# Patient Record
Sex: FEMALE | NOT HISPANIC OR LATINO | URBAN - METROPOLITAN AREA
[De-identification: names, ages, dates, MRNs, and addresses within clinical notes are randomized per-mention and may not be internally consistent; named-entity substitution may affect disease eponyms.]

---

## 2018-06-27 ENCOUNTER — OFFICE VISIT - HEALTHEAST (OUTPATIENT)
Dept: FAMILY MEDICINE | Facility: CLINIC | Age: 63
End: 2018-06-27

## 2018-06-27 DIAGNOSIS — Z48.02 VISIT FOR SUTURE REMOVAL: ICD-10-CM

## 2019-03-01 ENCOUNTER — OFFICE VISIT - RIVER FALLS (OUTPATIENT)
Dept: FAMILY MEDICINE | Facility: CLINIC | Age: 64
End: 2019-03-01

## 2019-03-01 ASSESSMENT — MIFFLIN-ST. JEOR: SCORE: 1637.66

## 2019-03-02 LAB
A/G RATIO - HISTORICAL: 1.6 (ref 1–2.5)
ALBUMIN SERPL-MCNC: 4.1 GM/DL (ref 3.6–5.1)
ALP SERPL-CCNC: 79 UNIT/L (ref 33–130)
ALT SERPL W P-5'-P-CCNC: 11 UNIT/L (ref 6–29)
AST SERPL W P-5'-P-CCNC: 12 UNIT/L (ref 10–35)
BILIRUB SERPL-MCNC: 0.3 MG/DL (ref 0.2–1.2)
BUN SERPL-MCNC: 13 MG/DL (ref 7–25)
BUN/CREAT RATIO - HISTORICAL: ABNORMAL (ref 6–22)
CALCIUM SERPL-MCNC: 9.1 MG/DL (ref 8.6–10.4)
CHLORIDE BLD-SCNC: 106 MMOL/L (ref 98–110)
CO2 SERPL-SCNC: 28 MMOL/L (ref 20–32)
CREAT SERPL-MCNC: 0.91 MG/DL (ref 0.5–0.99)
EGFRCR SERPLBLD CKD-EPI 2021: 66 ML/MIN/1.73M2
ERYTHROCYTE [DISTWIDTH] IN BLOOD BY AUTOMATED COUNT: 15.9 % (ref 11–15)
GLOBULIN: 2.6 (ref 1.9–3.7)
GLUCOSE BLD-MCNC: 110 MG/DL (ref 65–99)
HBA1C MFR BLD: 6.3 %
HCT VFR BLD AUTO: 30.9 % (ref 35–45)
HGB BLD-MCNC: 8.9 GM/DL (ref 11.7–15.5)
MCH RBC QN AUTO: 19.4 PG (ref 27–33)
MCHC RBC AUTO-ENTMCNC: 28.8 GM/DL (ref 32–36)
MCV RBC AUTO: 67.3 FL (ref 80–100)
PLATELET # BLD AUTO: 325 10*3/UL (ref 140–400)
PMV BLD: 9.9 FL (ref 7.5–12.5)
POTASSIUM BLD-SCNC: 4.3 MMOL/L (ref 3.5–5.3)
PROT SERPL-MCNC: 6.7 GM/DL (ref 6.1–8.1)
RBC # BLD AUTO: 4.59 10*6/UL (ref 3.8–5.1)
SODIUM SERPL-SCNC: 141 MMOL/L (ref 135–146)
TSH SERPL DL<=0.005 MIU/L-ACNC: 2.18 MIU/L (ref 0.4–4.5)
WBC # BLD AUTO: 5.7 10*3/UL (ref 3.8–10.8)

## 2019-03-05 LAB
FERRITIN SERPL-MCNC: 5 NG/ML (ref 20–288)
IRON SATURATION: 3 (ref 11–50)
IRON SERPL-MCNC: 14 MCG/DL (ref 45–160)
LDLC SERPL CALC-MCNC: 131 MG/DL
TIBC - QUEST: 438 (ref 250–450)
TRANSFERRIN: 339 MG/DL (ref 188–341)

## 2019-03-15 ENCOUNTER — OFFICE VISIT - RIVER FALLS (OUTPATIENT)
Dept: FAMILY MEDICINE | Facility: CLINIC | Age: 64
End: 2019-03-15

## 2019-03-15 ASSESSMENT — MIFFLIN-ST. JEOR: SCORE: 1624.05

## 2019-04-10 ENCOUNTER — OFFICE VISIT - RIVER FALLS (OUTPATIENT)
Dept: FAMILY MEDICINE | Facility: CLINIC | Age: 64
End: 2019-04-10

## 2019-04-11 LAB
ERYTHROCYTE [DISTWIDTH] IN BLOOD BY AUTOMATED COUNT: 25.5 % (ref 11–15)
HCT VFR BLD AUTO: 40.2 % (ref 35–45)
HGB BLD-MCNC: 12.1 GM/DL (ref 11.7–15.5)
MCH RBC QN AUTO: 22.1 PG (ref 27–33)
MCHC RBC AUTO-ENTMCNC: 30.1 GM/DL (ref 32–36)
MCV RBC AUTO: 73.5 FL (ref 80–100)
PLATELET # BLD AUTO: 287 10*3/UL (ref 140–400)
PMV BLD: 11 FL (ref 7.5–12.5)
RBC # BLD AUTO: 5.47 10*6/UL (ref 3.8–5.1)
WBC # BLD AUTO: 4.9 10*3/UL (ref 3.8–10.8)

## 2019-07-19 ENCOUNTER — OFFICE VISIT - RIVER FALLS (OUTPATIENT)
Dept: FAMILY MEDICINE | Facility: CLINIC | Age: 64
End: 2019-07-19

## 2019-07-19 ASSESSMENT — MIFFLIN-ST. JEOR: SCORE: 1650.36

## 2019-07-20 LAB
ERYTHROCYTE [DISTWIDTH] IN BLOOD BY AUTOMATED COUNT: 17.2 % (ref 11–15)
FERRITIN SERPL-MCNC: 27 NG/ML (ref 16–288)
HCT VFR BLD AUTO: 41.1 % (ref 35–45)
HGB BLD-MCNC: 12.8 GM/DL (ref 11.7–15.5)
IRON SATURATION: 17 (ref 16–45)
IRON SERPL-MCNC: 72 MCG/DL (ref 45–160)
MCH RBC QN AUTO: 24.4 PG (ref 27–33)
MCHC RBC AUTO-ENTMCNC: 31.1 GM/DL (ref 32–36)
MCV RBC AUTO: 78.3 FL (ref 80–100)
PLATELET # BLD AUTO: 315 10*3/UL (ref 140–400)
PMV BLD: 10.8 FL (ref 7.5–12.5)
RBC # BLD AUTO: 5.25 10*6/UL (ref 3.8–5.1)
TIBC - QUEST: 416 (ref 250–450)
WBC # BLD AUTO: 6.7 10*3/UL (ref 3.8–10.8)

## 2019-07-24 ENCOUNTER — COMMUNICATION - RIVER FALLS (OUTPATIENT)
Dept: FAMILY MEDICINE | Facility: CLINIC | Age: 64
End: 2019-07-24

## 2019-07-25 ENCOUNTER — OFFICE VISIT - RIVER FALLS (OUTPATIENT)
Dept: FAMILY MEDICINE | Facility: CLINIC | Age: 64
End: 2019-07-25

## 2019-08-16 ENCOUNTER — AMBULATORY - RIVER FALLS (OUTPATIENT)
Dept: FAMILY MEDICINE | Facility: CLINIC | Age: 64
End: 2019-08-16

## 2019-08-22 ENCOUNTER — OFFICE VISIT - RIVER FALLS (OUTPATIENT)
Dept: FAMILY MEDICINE | Facility: CLINIC | Age: 64
End: 2019-08-22

## 2019-09-05 ENCOUNTER — AMBULATORY - RIVER FALLS (OUTPATIENT)
Dept: FAMILY MEDICINE | Facility: CLINIC | Age: 64
End: 2019-09-05

## 2019-09-06 ENCOUNTER — COMMUNICATION - RIVER FALLS (OUTPATIENT)
Dept: FAMILY MEDICINE | Facility: CLINIC | Age: 64
End: 2019-09-06

## 2019-09-06 LAB
BUN SERPL-MCNC: 19 MG/DL (ref 7–25)
BUN/CREAT RATIO - HISTORICAL: 18 (ref 6–22)
CALCIUM SERPL-MCNC: 10 MG/DL (ref 8.6–10.4)
CHLORIDE BLD-SCNC: 103 MMOL/L (ref 98–110)
CO2 SERPL-SCNC: 30 MMOL/L (ref 20–32)
CREAT SERPL-MCNC: 1.04 MG/DL (ref 0.5–0.99)
EGFRCR SERPLBLD CKD-EPI 2021: 56 ML/MIN/1.73M2
GLUCOSE BLD-MCNC: 97 MG/DL (ref 65–139)
POTASSIUM BLD-SCNC: 3.9 MMOL/L (ref 3.5–5.3)
SODIUM SERPL-SCNC: 140 MMOL/L (ref 135–146)

## 2019-10-03 ENCOUNTER — OFFICE VISIT - RIVER FALLS (OUTPATIENT)
Dept: FAMILY MEDICINE | Facility: CLINIC | Age: 64
End: 2019-10-03

## 2019-10-03 ASSESSMENT — MIFFLIN-ST. JEOR: SCORE: 1587.76

## 2019-10-04 ENCOUNTER — OFFICE VISIT - RIVER FALLS (OUTPATIENT)
Dept: FAMILY MEDICINE | Facility: CLINIC | Age: 64
End: 2019-10-04

## 2020-08-05 ENCOUNTER — AMBULATORY - RIVER FALLS (OUTPATIENT)
Dept: FAMILY MEDICINE | Facility: CLINIC | Age: 65
End: 2020-08-05

## 2020-08-06 LAB
ALBUMIN SERPL-MCNC: 4.2 GM/DL (ref 3.6–5.1)
BUN SERPL-MCNC: 20 MG/DL (ref 7–25)
BUN/CREAT RATIO - HISTORICAL: NORMAL (ref 6–22)
CALCIUM SERPL-MCNC: 10.1 MG/DL (ref 8.6–10.4)
CHLORIDE BLD-SCNC: 102 MMOL/L (ref 98–110)
CO2 SERPL-SCNC: 29 MMOL/L (ref 20–32)
CREAT SERPL-MCNC: 0.9 MG/DL (ref 0.5–0.99)
EGFRCR SERPLBLD CKD-EPI 2021: 67 ML/MIN/1.73M2
ERYTHROCYTE [DISTWIDTH] IN BLOOD BY AUTOMATED COUNT: 13 % (ref 11–15)
FERRITIN SERPL-MCNC: 20 NG/ML (ref 16–288)
GLUCOSE BLD-MCNC: 90 MG/DL (ref 65–99)
HCT VFR BLD AUTO: 45.7 % (ref 35–45)
HGB BLD-MCNC: 14.9 GM/DL (ref 11.7–15.5)
IRON SATURATION: 25 (ref 16–45)
IRON SERPL-MCNC: 91 MCG/DL (ref 45–160)
MCH RBC QN AUTO: 27.9 PG (ref 27–33)
MCHC RBC AUTO-ENTMCNC: 32.6 GM/DL (ref 32–36)
MCV RBC AUTO: 85.6 FL (ref 80–100)
PHOSPHATE SERPL-MCNC: 3.5 MG/DL (ref 2.1–4.3)
PLATELET # BLD AUTO: 229 10*3/UL (ref 140–400)
PMV BLD: 11.8 FL (ref 7.5–12.5)
POTASSIUM BLD-SCNC: 3.9 MMOL/L (ref 3.5–5.3)
RBC # BLD AUTO: 5.34 10*6/UL (ref 3.8–5.1)
SODIUM SERPL-SCNC: 139 MMOL/L (ref 135–146)
TIBC - QUEST: 360 (ref 250–450)
WBC # BLD AUTO: 5.4 10*3/UL (ref 3.8–10.8)

## 2020-08-14 ENCOUNTER — COMMUNICATION - RIVER FALLS (OUTPATIENT)
Dept: FAMILY MEDICINE | Facility: CLINIC | Age: 65
End: 2020-08-14

## 2021-04-02 ENCOUNTER — OFFICE VISIT - RIVER FALLS (OUTPATIENT)
Dept: FAMILY MEDICINE | Facility: CLINIC | Age: 66
End: 2021-04-02

## 2021-04-02 LAB
BUN SERPL-MCNC: 15 MG/DL (ref 7–25)
BUN/CREAT RATIO - HISTORICAL: ABNORMAL (ref 6–22)
CALCIUM SERPL-MCNC: 10.3 MG/DL (ref 8.6–10.4)
CHLORIDE BLD-SCNC: 103 MMOL/L (ref 98–110)
CO2 SERPL-SCNC: 29 MMOL/L (ref 20–32)
CREAT SERPL-MCNC: 0.91 MG/DL (ref 0.5–0.99)
EGFRCR SERPLBLD CKD-EPI 2021: 65 ML/MIN/1.73M2
GLUCOSE BLD-MCNC: 112 MG/DL (ref 65–99)
POTASSIUM BLD-SCNC: 4.2 MMOL/L (ref 3.5–5.3)
SODIUM SERPL-SCNC: 139 MMOL/L (ref 135–146)

## 2021-06-01 VITALS — WEIGHT: 240.4 LBS

## 2021-06-18 NOTE — PROGRESS NOTES
Subjective:      Shannon Lynn is a 62 y.o. female who presents today as a new patient.   For suture removal,notes she has sutres in the back of her head. She states she was working, the stool fell and she hit her head on the stool. Injury occurred on 2018. She was seen in [facility] and sutures place, unsure how many placed.   Denies headaches, fever, chills, pain or any drainage from stie.   Feels like it healing well.   PCP in [place].   States tetanus up to date.   Denies any acute concerns today  Presents today for work to have her sutures removed.     Reviewed past medical/surgical history, family history, social history, medications and updated chart below.     No Known Allergies  History reviewed. No pertinent past medical history.  Past Surgical History:   Procedure Laterality Date      SECTION, CLASSIC       CHOLECYSTECTOMY       HYSTERECTOMY       Social History     Social History     Marital status:      Spouse name: Victoriano     Number of children: 2     Years of education: N/A     Occupational History     Not on file.     Social History Main Topics     Smoking status: Never Smoker     Smokeless tobacco: Never Used     Alcohol use Yes      Comment: 1-2/month     Drug use: No     Sexual activity: Yes     Partners: Male     Birth control/ protection: Surgical     Other Topics Concern     Not on file     Social History Narrative     Family History   Problem Relation Age of Onset     Diabetes Mother      Heart disease Father      No current outpatient prescriptions on file.     No current facility-administered medications for this visit.      There are no active problems to display for this patient.      Review of Systems   Constitutional: Negative.   HENT: Negative.   Eyes: Negative.   Respiratory: Negative.   Cardiovascular: Negative.   Gastrointestinal: Negative.   Endocrine: Negative.   Genitourinary: Negative.   Musculoskeletal: Negative.   Skin: sutures head    Allergic/Immunologic: Negative.   Neurological: Negative.   Hematological: Negative.   Psychiatric/Behavioral: Negative.     Objective:    Physical Exam   /78  Pulse 60  Wt (!) 240 lb 6.4 oz (109 kg)    Constitutional: Alert and oriented x3, well nourished without any acute distress  HENT:   Right Ear: External ear normal.   Left Ear: External ear normal.   Nose: Nose normal.   Mouth/Throat: Oropharynx is clear and moist.   Eyes: Conjunctivae and EOM are normal. Pupils are equal, round, and reactive to light. Right eye exhibits no discharge. Left eye exhibits no discharge.   Neck: No thyromegaly present.   Lymphadenopathy: Without palpable lymphadenopathy  Cardiovascular: Normal S1 and S2. Regular rate, rhythm and no murmur, rubs or gallops. Palpable distal pulses bilaterally.  Pulmonary/Chest: Normal effort. Lungs clear to auscultation in all lobes. Without wheezing, rhonchi or crackles.    Abdominal: Soft, non tenderness. There is no rebound or guarding. Bowel sounds x4. Without organomegaly. No CVA tenderness.  Musculoskeletal: 5/5 strength  And full ROM in all extremities. No joint swelling or deformity  Neurological: Cranial nerves intact, without deficit. Without numbness, tingling or paresthesia. Normal reflexes  Skin: scalp, sutures 3 right occipital lesion healed approximated  Psychiatric: Normal mood and affect.            Assessment/Plan:    Suture removal  Date/Time: 6/27/2018 2:36 PM  Performed by: BUBBA ROMEO  Authorized by: BUBBA ROMEO   Body area: head/neck  Location details: scalp  Wound Appearance: clean  Sutures Removed: 3  Post-removal: antibiotic ointment applied  Facility: sutures placed in this facility  Patient tolerance: Patient tolerated the procedure well with no immediate complications  Comments: Wound healed, approximated without any signs of infection      1. Visit for suture removal  Removed without any concerns. Discussed with patient to follow up if worsening  symptoms, questions or concerns  I did recommend for Shannon to follow up with PCP for fasting physical exam.   She did decline mammogram, colonoscopy preventative labs today  - Suture removal  Ronda Gamboa, NATASHA  6/27/2018

## 2021-10-28 ENCOUNTER — OFFICE VISIT - RIVER FALLS (OUTPATIENT)
Dept: FAMILY MEDICINE | Facility: CLINIC | Age: 66
End: 2021-10-28

## 2022-02-12 VITALS
OXYGEN SATURATION: 99 % | TEMPERATURE: 98.5 F | TEMPERATURE: 97.4 F | SYSTOLIC BLOOD PRESSURE: 124 MMHG | WEIGHT: 244 LBS | BODY MASS INDEX: 40.65 KG/M2 | HEIGHT: 65 IN | TEMPERATURE: 97.8 F | HEART RATE: 60 BPM | BODY MASS INDEX: 40.2 KG/M2 | SYSTOLIC BLOOD PRESSURE: 128 MMHG | DIASTOLIC BLOOD PRESSURE: 70 MMHG | DIASTOLIC BLOOD PRESSURE: 80 MMHG | BODY MASS INDEX: 40.15 KG/M2 | SYSTOLIC BLOOD PRESSURE: 140 MMHG | HEART RATE: 72 BPM | WEIGHT: 241 LBS | DIASTOLIC BLOOD PRESSURE: 76 MMHG | OXYGEN SATURATION: 98 % | HEIGHT: 65 IN | WEIGHT: 241.6 LBS | HEART RATE: 57 BPM

## 2022-02-12 VITALS
OXYGEN SATURATION: 97 % | DIASTOLIC BLOOD PRESSURE: 70 MMHG | SYSTOLIC BLOOD PRESSURE: 136 MMHG | HEART RATE: 77 BPM | DIASTOLIC BLOOD PRESSURE: 98 MMHG | BODY MASS INDEX: 39.57 KG/M2 | HEIGHT: 65 IN | SYSTOLIC BLOOD PRESSURE: 124 MMHG | BODY MASS INDEX: 38.82 KG/M2 | WEIGHT: 233 LBS | WEIGHT: 237.8 LBS | DIASTOLIC BLOOD PRESSURE: 84 MMHG | SYSTOLIC BLOOD PRESSURE: 148 MMHG | HEART RATE: 56 BPM | OXYGEN SATURATION: 97 %

## 2022-02-12 VITALS
TEMPERATURE: 98.6 F | SYSTOLIC BLOOD PRESSURE: 142 MMHG | HEIGHT: 65 IN | DIASTOLIC BLOOD PRESSURE: 82 MMHG | WEIGHT: 244 LBS | SYSTOLIC BLOOD PRESSURE: 156 MMHG | HEART RATE: 68 BPM | WEIGHT: 246.8 LBS | BODY MASS INDEX: 40.6 KG/M2 | DIASTOLIC BLOOD PRESSURE: 96 MMHG | BODY MASS INDEX: 41.12 KG/M2 | HEART RATE: 66 BPM

## 2022-02-12 VITALS
DIASTOLIC BLOOD PRESSURE: 94 MMHG | SYSTOLIC BLOOD PRESSURE: 141 MMHG | HEART RATE: 67 BPM | RESPIRATION RATE: 16 BRPM | OXYGEN SATURATION: 98 %

## 2022-02-12 VITALS — DIASTOLIC BLOOD PRESSURE: 89 MMHG | SYSTOLIC BLOOD PRESSURE: 161 MMHG | HEART RATE: 55 BPM

## 2022-02-15 NOTE — LETTER
(Inserted Image. Unable to display)       March 06, 2019      LEATHA PIZARRO  [address] [facility]  [place] [id number]        Dear LEATHA,     Thank you for selecting [facility] for your healthcare needs. Below you will find the results of your recent test(s) done at our clinic.     I've got a copy of your labs enclosed for you.  I'd like to talk with you about the results.  Please come in for an appointment.      Result Name Current Result Previous Result Reference Range   Sodium Level (mmol/L)  141 3/1/2019  135 - 146   Potassium Level (mmol/L)  4.3 3/1/2019  3.5 - 5.3   Chloride Level (mmol/L)  106 3/1/2019  98 - 110   CO2 Level (mmol/L)  28 3/1/2019  20 - 32   Glucose Level (mg/dL) ((H)) 110 3/1/2019  65 - 99   BUN (mg/dL)  13 3/1/2019  7 - 25   Creatinine Level (mg/dL)  0.91 3/1/2019  0.50 - 0.99   BUN/Creat Ratio  NOT APPLICABLE 3/1/2019  6 - 22   eGFR (mL/min/1.73m2)  66 3/1/2019  > OR = 60 -    eGFR  (mL/min/1.73m2)  77 3/1/2019  > OR = 60 -    Calcium Level (mg/dL)  9.1 3/1/2019  8.6 - 10.4   Bilirubin Total (mg/dL)  0.3 3/1/2019  0.2 - 1.2   Alkaline Phosphatase (unit/L)  79 3/1/2019  33 - 130   AST/SGOT (unit/L)  12 3/1/2019  10 - 35   ALT/SGPT (unit/L)  11 3/1/2019  6 - 29   Protein Total (gm/dL)  6.7 3/1/2019  6.1 - 8.1   Albumin Level (gm/dL)  4.1 3/1/2019  3.6 - 5.1   Globulin  2.6 3/1/2019  1.9 - 3.7   A/G Ratio  1.6 3/1/2019  1.0 - 2.5   Hgb A1c ((H)) 6.3 3/1/2019   - <5.7   LDL Direct (mg/dL) ((H)) 131 3/1/2019   - <100   TSH (mIU/L)  2.18 3/1/2019  0.40 - 4.50   Iron Level (mcg/dL) ((L)) 14 3/1/2019  45 - 160   TIBC  438 3/1/2019  250 - 450   Iron Saturation ((L)) 3 3/1/2019  11 - 50   Ferritin (ng/mL) ((L)) 5 3/1/2019  20 - 288   Transferrin (mg/dL)  339 3/1/2019  188 - 341   WBC  5.7 3/1/2019  3.8 - 10.8   RBC  4.59 3/1/2019  3.80 - 5.10   Hgb (gm/dL) ((L)) 8.9 3/1/2019  11.7 - 15.5   Hct (%) ((L)) 30.9 3/1/2019  35.0 - 45.0   MCV (fL) ((L)) 67.3 3/1/2019  80.0 -  100.0   MCH (pg) ((L)) 19.4 3/1/2019  27.0 - 33.0   MCHC (gm/dL) ((L)) 28.8 3/1/2019  32.0 - 36.0   RDW (%) ((H)) 15.9 3/1/2019  11.0 - 15.0   Platelet  325 3/1/2019  140 - 400   MPV (fL)  9.9 3/1/2019  7.5 - 12.5   Test in Question - Test(s) Ordered   FERRITIN IRON AND TOTAL IRON 3/1/2019     Misc Test CPT Code   457SB 7573SB 891SB 3/1/2019     Misc Test Client Contact  JESUS SUERO 3/1/2019     Misc Test Comment  See comment 3/1/2019  See comment 3/1/2019    Misc Test Comment  See comment 3/1/2019  See comment 3/1/2019      Please contact my practice at [phone] if you have any questions or concerns.     Sincerely,        July Aparicio MD      What do your labs mean?  Below is a glossary of commonly ordered labs:  LDL   Bad Cholesterol   HDL   Good Cholesterol  AST/ALT   Liver Function   Cr/Creatinine   Kidney Function  Microalbumin   Kidney Function  BUN   Kidney Function  PSA   Prostate    TSH   Thyroid Hormone  HgbA1c   Diabetes Test   Hgb (Hemoglobin)   Red Blood Cells

## 2022-02-15 NOTE — TELEPHONE ENCOUNTER
---------------------  From: Marilyn Melvin (Phone Messages Pool ([address] [place] [address]))   To: Phone Messages Pool ([address] [place] [address]);     Sent: 8/24/2020 9:56:43 AM CDT  Subject: Labs/Med Refill     Phone Message    PCP: MARIBELL    Time of Call: 0905    Phone Number: [phone]    Returned call at: 0954    Note: Patient called stating that she had blood work done fore [facility] and she has not heard back with results. P was suppose to look at them and then decide how she was going to fill pt's medication.    Called pt back at 0955, wanting to know what medication she was wanting refilled. No answer- phone was busy.    Attached is a lab result letter.Call to pt at 0853  Phone: [phone]    [facility] to call back.Pt called at 0949, asking for a call back. Called pt and she stated that she is needing her HCTZ filled as [facility] was suppose to look over her labs and she never did. Please advise if ok to fill HCTZ.---------------------  From: Marilyn Melvin (Phone Messages Pool ([address]))   To: Advanced Practice Provider Pool ([facility]);     Sent: 8/25/2020 9:51:48 AM CDT  Subject: FW: Labs/Med Refill---------------------  From: Gelacio John PA-C (Advanced Practice Provider Pool ([address] [place] [address]))   To: Phone Messages Pool ([address] [place] [address]);     Sent: 8/25/2020 12:04:27 PM CDT  Subject: RE: Labs/Med Refill     okay to fill her HCTZ for 6 monthsFilled for 6 months.

## 2022-02-15 NOTE — TELEPHONE ENCOUNTER
---------------------  From: Alondra Paris CMA   Sent: 11/3/2021 11:01:21 AM CDT  Subject: Covid Order     Pt canceled curbside appt for covid testing last week. Never rescheduled-canceled covid order since it has been 1 week.

## 2022-02-15 NOTE — TELEPHONE ENCOUNTER
---------------------  From: Peyton Aguilera   Sent: 9/6/2019 2:02:11 PM CDT  Subject: Rx     Phone Message    PCP:   MARIBELL                           Time of Call:  1:33pm                                        Person Calling:  Pt  Phone number:  [phone]    Returned call at: 2p    Note:   Pt called [facility]ison she was not able to get her prescription for Lanzoprazole. I called the pharmacy and it is there and they can fill it for a 30 day supply that is the only way the insurance will cover it. Called and informed the pt of this, she tried to do it through the system but she had to talk to someone there. She will call right now.

## 2022-02-15 NOTE — LETTER
(Inserted Image. Unable to display)       March 04, 2019        LEATHA PIZARRO  [address] [place] [id number]      Dear LEATHA,      Thank you for selecting [facility] for your healthcare needs.     I hope your appointment with Dr. Justice went well.  Your shoulder xr does show significant arthritis.          Please contact my practice at [phone] if you have any questions or concerns.     Sincerely,        July Aparicio MD

## 2022-02-15 NOTE — PROGRESS NOTES
Chief Complaint    HTN check  History of Present Illness             patient present to clinic today for follow up HCTZ caused increase in creatinine, feeling much better since starting her cpap,      She does have a history of hypertension, no chest pain or headaches at this time.       She also has a history of anemia.  She has been taking her iron supplement.  Not always take it 3 times a day however.      Review of systems is negative except as per HPI including:  no fevers, chills, sore throat, runny nose, nausea, vomiting, constipation, diarrhea, rash or new skin lesions, chest pain, palpitations, slurred speech, new paresthesia, shortness of breath or wheeze.      Exam:      General: alert and oriented ×3 no acute distress.      HEENT: pupils are equal round and reactive to light extraocular motion is intact. Normocephalic and atraumatic.       Hearing is grossly normal and there is no otorrhea.       Nares are patent there is no rhinorrhea.       Mucous membranes are moist and pink.      Chest: has bilateral rise with no increased work of breathing.      Cardiovascular: normal perfusion and brisk capillary refill.      Musculoskeletal: no gross focal abnormalities and normal gait.      Neuro: no gross focal abnormalities and memory seems intact.      Psychiatric: speech is clear and coherent and fluent. Patient dressed appropriately for the weather. Mood is appropriate and affect is full.                     Discussed with patient to return to clinic if symptoms worsen or do not improve  Physical Exam   Vitals & Measurements    HR: 77(Peripheral)  BP: 124/70  SpO2: 97%     WT: 237.8 lb   Assessment/Plan       Anemia (D64.9)                Hypertension (I10)        We are going to switch her from hydrochlorothiazide to losartan.  We will have to have her come back in to get her blood pressure rechecked in half of her basic metabolic panel rechecked.  15 minutes spent with patient in direct face-to-face  contact of which greater than 50% was spent counseling patient and coordinating care.         Ordered:          hydroCHLOROthiazide, = 1 tab(s) ( 25 mg ), Oral, daily, # 90 tab(s), 3 Refill(s), Type: Maintenance, Pharmacy: [id number], 1 tab(s) Oral daily, (Completed)          losartan, = 1 tab(s) ( 25 mg ), Oral, daily, # 90 tab(s), 0 Refill(s), Type: Maintenance, [id number], 1 tab(s) Oral daily, (Ordered)                Orders:         EGD* (Request), Iron deficiency anemia         Occupational Therapy Evaluation and Treatment (Request), Shoulder pain         Return to Clinic (Request), RFV: 1 week lab only and BP check for bmp         Return to Clinic (Request), RFV: lab only cbc, total iron and TIBC and ferritin, Return in 3 months         Return to Clinic (Request), RFV: follow up shortness of breath, Return in 4 weeks      Patient is going to continue on her iron supplement for another 3 months and then we will recheck her iron studies.  Patient Information     Name:LEATHA PIZARRO      Address:      [address] [place], [place] [id number]     Sex:Female     YOB: 1955     [phone]     Emergency Contact:ALEX PIZARRO     MRN:[id number]     FIN:[id number]     Location:[facility]     Date of Service:10/04/2019      Primary Care Physician:       July Aparicio MD, [phone]      Attending Physician:       July Aparicio MD, [phone]  Problem List/Past Medical History    Ongoing     Anemia     Diverticulitis       Comments: Diverticulitis of descending colon     Diverticulitis of sigmoid colon       Comments: moderate on descending colon and sigmoid seen on colonoscopy 6/19     Hemorrhoid       Comments: internal and external seen on 6/19 colonoscopy     Hiatal hernia     History of colon polyps     Hypertension     Moderate obstructive sleep apnea     Morbid obesity     Schatzki's ring       Comments: distal esophagus, dilated  06/01/2019    Historical     No qualifying data  Procedure/Surgical History     Polysomnogram (08/15/2019)            Comments: AHI: 17.9.     Colonoscopy (06/07/2019)            Comments: Indication:  Iron deviciency anemia.  Hemoccult positive stools.      Sedation:  MAC.      Impression:  One 3 mm sessile polyp in cecum.  Diverticulosis without diverticulitis.  External hemorrhoids.. Recommendation:  Repeat in 5 years,.     Esophagogastroduodenoscopy (06/07/2019)            Comments: Indication:  Iron deficiency anemia, dysphagia..     Adenomatous polyp            Comments: colonoscopy biopsy.     Cholecystectomy           Colonoscopy           Extraction of wisdom tooth           Hysterectomy           Tonsillectomy        Medications    Auto Titrating CPAP 6-16 for daily use, See Instructions, 11 refills    Flonase 50 mcg/inh nasal spray, 2 spray(s), Nasal, daily, 2 refills    lansoprazole 30 mg oral delayed release capsule, 30 mg= 1 cap(s), Oral, daily, 3 refills    losartan 25 mg oral tablet, 25 mg= 1 tab(s), Oral, daily  Allergies    No Known Medication Allergies  Social History    Smoking Status - 10/04/2019     Never smoker     Alcohol      Current, 0-1 TIME PER WEEK, 03/04/2019     Employment/School      Employed, Work/School description: Sales., 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 Exercise type: Cur[employer]., 03/07/2019     Home/Environment      Marital status: ., 03/06/2019     Substance Abuse - Denies Substance Abuse, 03/07/2019     Tobacco - Denies Tobacco Use, 03/06/2019  Family History    Arthritis: Father.    Diabetes mellitus type I: Mother and Sister.    Dyslipidemia: Father.    Heart disease: Sister.    High blood pressure: Father.    MI - Myocardial infarction: Father.    Smoker: Father.  Immunizations      Vaccine Date Status      influenza virus vaccine, inactivated 03/01/2019 Given      pneumococcal (PCV13) 03/01/2019 Given       tetanus/diphth/pertuss (Tdap) adult/adol 03/01/2019 Recorded      meningococcal conjugate vaccine 12/07/2007 Recorded      hepatitis B pediatric vaccine 12/07/2007 Recorded      influenza virus vaccine, inactivated 12/07/2007 Recorded      tetanus/diphth/pertuss (Tdap) adult/adol 12/07/2007 Recorded  Lab Results       Lab Results (Last 4 results within 90 days)        Sodium Level: 140 mmol/L [135 mmol/L - 146 mmol/L] (09/05/19 10:57:00)       Potassium Level: 3.9 mmol/L [3.5 mmol/L - 5.3 mmol/L] (09/05/19 10:57:00)       Chloride Level: 103 mmol/L [98 mmol/L - 110 mmol/L] (09/05/19 10:57:00)       CO2 Level: 30 mmol/L [20 mmol/L - 32 mmol/L] (09/05/19 10:57:00)       Glucose Level: 97 mg/dL [65 mg/dL - 139 mg/dL] (09/05/19 10:57:00)       BUN: 19 mg/dL [7 mg/dL - 25 mg/dL] (09/05/19 10:57:00)       Creatinine Level: 1.04 mg/dL High [0.5 mg/dL - 0.99 mg/dL] (09/05/19 10:57:00)       BUN/Creat Ratio: 18 [6  - 22] (09/05/19 10:57:00)       eGFR: 56 mL/min/1.73m2 Low (09/05/19 10:57:00)       eGFR : 65 mL/min/1.73m2 (09/05/19 10:57:00)       Calcium Level: 10 mg/dL [8.6 mg/dL - 10.4 mg/dL] (09/05/19 10:57:00)       Iron Level: 72 mcg/dL [45 mcg/dL - 160 mcg/dL] (07/19/19 12:25:00)       TIBC: 416 [250  - 450] (07/19/19 12:25:00)       Iron Saturation: 17 [16  - 45] (07/19/19 12:25:00)       Ferritin: 27 ng/mL [16 ng/mL - 288 ng/mL] (07/19/19 12:25:00)       WBC: 6.7 [3.8  - 10.8] (07/19/19 12:25:00)       RBC: 5.25 High [3.8  - 5.1] (07/19/19 12:25:00)       Hgb: 12.8 gm/dL [11.7 gm/dL - 15.5 gm/dL] (07/19/19 12:25:00)       Hct: 41.1 % [35 % - 45 %] (07/19/19 12:25:00)       MCV: 78.3 fL Low [80 fL - 100 fL] (07/19/19 12:25:00)       MCH: 24.4 pg Low [27 pg - 33 pg] (07/19/19 12:25:00)       MCHC: 31.1 gm/dL Low [32 gm/dL - 36 gm/dL] (07/19/19 12:25:00)       RDW: 17.2 % High [11 % - 15 %] (07/19/19 12:25:00)       Platelet: 315 [140  - 400] (07/19/19 12:25:00)       MPV: 10.8 fL [7.5 fL - 12.5 fL]  (07/19/19 12:25:00)

## 2022-02-15 NOTE — TELEPHONE ENCOUNTER
---------------------  From: Liliana Murray   To: LEATHA PIZARRO    Sent: 3/19/2021 1:38:07 PM CDT  Subject: General Message     You are due for a hypertension check with Dr. Aparicio. If you would like to schedule, please call [phone]. Thank you!

## 2022-02-15 NOTE — NURSING NOTE
Quick Intake Entered On:  9/5/2019 11:18 AM CDT    Performed On:  9/5/2019 11:13 AM CDT by Toyin Mary RN               Summary   Chief Complaint :   BP   Systolic Blood Pressure :   142 mmHg (HI)    Diastolic Blood Pressure :   88 mmHg (HI)    Mean Arterial Pressure :   106 mmHg   BP Site :   Right arm   BP Method :   Manual   Toyin Mary RN - 9/5/2019 11:18 AM CDT

## 2022-02-15 NOTE — LETTER
(Inserted Image. Unable to display)   February 24, 2021        LEATHA PIZARRO  [address] [place], [place] [id number]        Dear LEATHA,      Thank you for selecting [facility] for your healthcare needs.    Our records indicate you are due for the following services:     Hypertension check ~ please remember to bring your at-home blood pressure readings with you to your appointment.     (FYI   Regarding office visits: In some instances, a video visit or telephone visit may be offered as an option.)        To schedule an appointment or if you have further questions, please contact your clinic at [phone].      Powered by [employer]    Sincerely,    July Aparicio MD

## 2022-02-15 NOTE — NURSING NOTE
Comprehensive Intake Entered On:  3/1/2019 1:04 PM CST    Performed On:  3/1/2019 1:01 PM CST by Niki Camargo CMA               Summary   Chief Complaint :   establish care. Shoulder pain left, hip pain right side, right knee pain,  Allergy issues. Thyroid check, weight loss.    Weight Measured :   244.0 lb(Converted to: 244 lb 0 oz, 110.68 kg)    Height Measured :   65 in(Converted to: 5 ft 5 in, 165.10 cm)    Body Mass Index :   40.6 kg/m2 (HI)    Body Surface Area :   2.25 m2   Systolic Blood Pressure :   128 mmHg   Diastolic Blood Pressure :   80 mmHg   Mean Arterial Pressure :   96 mmHg   Peripheral Pulse Rate :   57 bpm (LOW)    BP Site :   Right arm   BP Method :   Manual   HR Method :   Electronic   Temperature Tympanic :   98.5 DegF(Converted to: 36.9 DegC)    Oxygen Saturation :   99 %   Niki Camargo CMA - 3/1/2019 1:01 PM CST   Health Status   Allergies Verified? :   Yes   Medication History Verified? :   Yes   Pre-Visit Planning Status :   Completed   Tobacco Use? :   Never smoker   Niki Camargo CMA - 3/1/2019 1:01 PM CST   Consents   Consent for Immunization Exchange :   Consent Granted   Consent for Immunizations to Providers :   Consent Granted   Niki Camargo CMA - 3/1/2019 1:01 PM CST   Meds / Allergies   (As Of: 3/1/2019 1:04:55 PM CST)   Allergies (Active)   No Known Medication Allergies  Estimated Onset Date:   Unspecified ; Created By:   Niki Camargo CMA; Reaction Status:   Active ; Category:   Drug ; Substance:   No Known Medication Allergies ; Type:   Allergy ; Updated By:   Niki Camargo CMA; Reviewed Date:   3/1/2019 1:02 PM CST        Medication List   (As Of: 3/1/2019 1:04:55 PM CST)

## 2022-02-15 NOTE — LETTER
(Inserted Image. Unable to display)   2019        LEATHA PIZARRO  [address] [place], [place] [id number]        Dear LEATHA,      Thank you for selecting [facility] for your healthcare needs.    You previously had a colon cancer screening (colonoscopy or Cologuard) at our facility and our records indicate you are now due for a follow up.  Although there may not be a problem at this time, it is our medical recommendation that you schedule an appointment for an exam with your Healthcare Provider to assess your health history to determine the proper colon cancer screening needed.  This exam can be combined with any scheduled appointment with your primary physician (physicals, medication checks, etc.)    Your Healthcare Provider will then complete the appropriate colon cancer screening order form for either a colonoscopy or Cologuard screening.   For a colonoscopy order, we encourage you to schedule your appointment that day.  Please note that depending on the type of anesthetic you are to receive your order will  in 30 or 90 days from your visit with your Healthcare Provider.  If you do not set up your colonoscopy during this time frame you will need to have another exam as your health history may have changed.    If you are seeing another physician for this problem, we would appreciate knowing so we do not send you another reminder.    To schedule an appointment or if you have further questions, please contact your primary clinic:   [facility]       [phone]   [facility]       [phone]              [facility]     [phone]      Powered by [employer]    Sincerely,    July Aparicio MD

## 2022-02-15 NOTE — PROGRESS NOTES
Chief Complaint    f/u iron deficiency  History of Present Illness      Patient is here today to follow-up with me regarding her iron deficiency anemia.  She reports that she did go and have a consult with GI.  She was seen by PA and they have ordered a upper and lower endoscopy for her.  She was told to take her proton pump inhibitor however she has not yet started this and continues to have some symptoms of gastritis with some 9 abdominal pain and heartburn.  She also has not started taking an iron supplement.  She denies melena or bright red blood per rectum.  She has no melena emesis.  Review of Systems      12 point review of systems is negative except as per HPI general alert and oriented x3 no acute distress      HEENT pupils equal round reactive to light, hearing is grossly normal, mucous memories moist and pink      Chest bilateral rise no increased work of breathing       Cardiovascular normal tissue perfusion and brisk capillary refill       Musculoskeletal patient moving all 4 extremities and he has a normal gait  Physical Exam   Vitals & Measurements    T: 97.8   F (Tympanic)  HR: 72(Peripheral)  BP: 140/70  SpO2: 98%     WT: 241.6 lb   Assessment/Plan       Anemia (D64.9)         Discussed that anemia is a symptom of some other type of process and that I would not expected to have gotten any better with out her having had a chance to start some iron yet and would like for her to start this.  If she finds it constipating she can try some MiraLAX and Benefiber.  Iron is best absorbed in the ascitic environment so she should take it with some vitamin C.  She will also need to start a proton pump inhibitor of her choice.  She had originally wanted Dexilant however found this to be cost prohibitive so was switched to lansoprazole.  If she finds this when cost prohibitive we could certainly try pantoprazole or omeprazole instead.  Would like her to follow-up with GI and get her scopes done and if nothing is  found there then we will have to further investigate her anemia.  15 minutes was spent with patient in direct face-to-face contact of which greater than 50% of time spent counseling patient correlating care.  Patient Information     Name:LEATHA PIZARRO      Address:      [address] [place] [address]     Sex:Female     YOB: 1955     [phone]     Emergency Contact:ALEX PIZARRO     MRN:[id number]     FIN:[id number]     Location:[facility]     Date of Service:04/10/2019      Primary Care Physician:       July Aparicio MD, [phone]      Attending Physician:       July Aparicio MD, [phone]  Problem List/Past Medical History    Ongoing     Hiatal hernia     History of colon polyps     Morbid obesity    Historical     No qualifying data  Procedure/Surgical History     Cholecystectomy     Colonoscopy     Extraction of wisdom tooth     Hysterectomy     Tonsillectomy  Medications        lansoprazole 30 mg oral delayed release capsule: 30 mg, 1 cap(s), Oral, daily, 90 cap(s), 3 Refill(s).         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 Exercise type: Curves., 03/07/2019     Home and Environment      Marital status: ., 03/06/2019     Substance Abuse - Denies Substance Abuse, 03/07/2019     Tobacco - Denies Tobacco Use, 03/06/2019  Family History    Arthritis: Father.    Diabetes mellitus type I: Mother and Sister.    Dyslipidemia: Father.    Heart disease: Sister.    High blood pressure: Father.    MI - Myocardial infarction: Father.    Smoker: Father.  Immunizations      Vaccine Date Status      influenza virus vaccine, inactivated  03/01/2019 Given      pneumococcal (PCV13) 03/01/2019 Given      tetanus/diphth/pertuss (Tdap) adult/adol 03/01/2019 Recorded      meningococcal conjugate vaccine 12/07/2007 Recorded      influenza virus vaccine, inactivated 12/07/2007 Recorded      tetanus/diphth/pertuss (Tdap) adult/adol 12/07/2007 Recorded  Lab Results          Lab Results (Last 4 results within 90 days)           Sodium Level: 141 mmol/L [135 mmol/L - 146 mmol/L] (03/01/19 15:21:00)          Potassium Level: 4.3 mmol/L [3.5 mmol/L - 5.3 mmol/L] (03/01/19 15:21:00)          Chloride Level: 106 mmol/L [98 mmol/L - 110 mmol/L] (03/01/19 15:21:00)          CO2 Level: 28 mmol/L [20 mmol/L - 32 mmol/L] (03/01/19 15:21:00)          Glucose Level: 110 mg/dL High [65 mg/dL - 99 mg/dL] (03/01/19 15:21:00)          BUN: 13 mg/dL [7 mg/dL - 25 mg/dL] (03/01/19 15:21:00)          Creatinine Level: 0.91 mg/dL [0.5 mg/dL - 0.99 mg/dL] (03/01/19 15:21:00)          BUN/Creat Ratio: NOT APPLICABLE [6  - 22] (03/01/19 15:21:00)          eGFR: 66 mL/min/1.73m2 (03/01/19 15:21:00)          eGFR African American: 77 mL/min/1.73m2 (03/01/19 15:21:00)          Calcium Level: 9.1 mg/dL [8.6 mg/dL - 10.4 mg/dL] (03/01/19 15:21:00)          Bilirubin Total: 0.3 mg/dL [0.2 mg/dL - 1.2 mg/dL] (03/01/19 15:21:00)          Alkaline Phosphatase: 79 unit/L [33 unit/L - 130 unit/L] (03/01/19 15:21:00)          AST/SGOT: 12 unit/L [10 unit/L - 35 unit/L] (03/01/19 15:21:00)          ALT/SGPT: 11 unit/L [6 unit/L - 29 unit/L] (03/01/19 15:21:00)          Protein Total: 6.7 gm/dL [6.1 gm/dL - 8.1 gm/dL] (03/01/19 15:21:00)          Albumin Level: 4.1 gm/dL [3.6 gm/dL - 5.1 gm/dL] (03/01/19 15:21:00)          Globulin: 2.6 [1.9  - 3.7] (03/01/19 15:21:00)          A/G Ratio: 1.6 [1  - 2.5] (03/01/19 15:21:00)          Hgb A1c: 6.3 High (03/01/19 15:21:00)          LDL Direct: 131 mg/dL High (03/01/19 15:21:00)          TSH: 2.18 mIU/L [0.4 mIU/L - 4.5 mIU/L] (03/01/19 15:21:00)           Iron Level: 14 mcg/dL Low [45 mcg/dL - 160 mcg/dL] (03/01/19 15:21:00)          TIBC: 438 [250  - 450] (03/01/19 15:21:00)          Iron Saturation: 3 Low [11  - 50] (03/01/19 15:21:00)          Ferritin: 5 ng/mL Low [20 ng/mL - 288 ng/mL] (03/01/19 15:21:00)          Transferrin: 339 mg/dL [188 mg/dL - 341 mg/dL] (03/01/19 15:21:00)          WBC: 4.9 [3.8  - 10.8] (04/10/19 11:26:00)          WBC: 5.7 [3.8  - 10.8] (03/01/19 15:21:00)          RBC: 5.47 High [3.8  - 5.1] (04/10/19 11:26:00)          RBC: 4.59 [3.8  - 5.1] (03/01/19 15:21:00)          Hgb: 12.1 gm/dL [11.7 gm/dL - 15.5 gm/dL] (04/10/19 11:26:00)          Hgb: 8.9 gm/dL Low [11.7 gm/dL - 15.5 gm/dL] (03/01/19 15:21:00)          Hct: 40.2 % [35 % - 45 %] (04/10/19 11:26:00)          Hct: 30.9 % Low [35 % - 45 %] (03/01/19 15:21:00)          MCV: 73.5 fL Low [80 fL - 100 fL] (04/10/19 11:26:00)          MCV: 67.3 fL Low [80 fL - 100 fL] (03/01/19 15:21:00)          MCH: 22.1 pg Low [27 pg - 33 pg] (04/10/19 11:26:00)          MCH: 19.4 pg Low [27 pg - 33 pg] (03/01/19 15:21:00)          MCHC: 30.1 gm/dL Low [32 gm/dL - 36 gm/dL] (04/10/19 11:26:00)          MCHC: 28.8 gm/dL Low [32 gm/dL - 36 gm/dL] (03/01/19 15:21:00)          RDW: 25.5 % High [11 % - 15 %] (04/10/19 11:26:00)          RDW: 15.9 % High [11 % - 15 %] (03/01/19 15:21:00)          Platelet: 287 [140  - 400] (04/10/19 11:26:00)          Platelet: 325 [140  - 400] (03/01/19 15:21:00)          MPV: 11 fL [7.5 fL - 12.5 fL] (04/10/19 11:26:00)          MPV: 9.9 fL [7.5 fL - 12.5 fL] (03/01/19 15:21:00)          Test in Question - Test(s) Ordered: FERRITIN IRON AND TOTAL IRON (03/01/19 15:21:00)          Misc Test CPT Code: 457SB 7573SB 891SB (03/01/19 15:21:00)          Misc Test Client Contact: JESUS SUERO (03/01/19 15:21:00)          Misc Test Comment: See comment (03/01/19 15:21:00)          Misc Test Comment: See comment (03/01/19 15:21:00)

## 2022-02-15 NOTE — LETTER
(Inserted Image. Unable to display)   2021        LEATHA PIZARRO  [address] [place], [place] [id number]        Dear LEATHA,      Thank you for selecting [facility] ([facility]viously [facility]) for your healthcare needs.    You previously had a colon cancer screening (colonoscopy or Cologuard) at our facility and our records indicate you are now due for a follow up.  Although there may not be a problem at this time, it is our medical recommendation that you schedule an appointment for an exam with your Healthcare Provider to assess your health history to determine the proper colon cancer screening needed.  This exam can be combined with any scheduled appointment with your primary physician (physicals, medication checks, etc.)    Your Healthcare Provider will then complete the appropriate colon cancer screening order form for either a colonoscopy or Cologuard screening.   For a colonoscopy order, we encourage you to schedule your appointment that day.  Please note that depending on the type of anesthetic you are to receive your order will  in 30 or 90 days from your visit with your Healthcare Provider.  If you do not set up your colonoscopy during this time frame you will need to have another exam as your health history may have changed.    If you are seeing another physician for this problem, we would appreciate knowing so we do not send you another reminder.    To schedule an appointment or if you have further questions, please contact your primary clinic:   [facility]       [phone]   [facility]       [phone]              [facility]     [phone]      Powered by [employer]    Sincerely,    July Aparicio MD

## 2022-02-15 NOTE — TELEPHONE ENCOUNTER
"---------------------  From: Ofe Cooper LPN (Phone Messages Pool ([id number]_[facility]))   To: [facility] Message Pool ([id number]_[place] [facility]);     Sent: 12/1/2021 4:48:24 PM CST  Subject: General Message       Phone Message Template      PCP:   MARIBELL      Time of Call:  1644       Person Calling:  pt  Phone number:  [phone]    Returned call at: _    Note:   patient is aware that she is due of labs and appt with [facility], will be [place] in the next two weeks. patient is asking for a refill of medication until care is established at new location. Hydrochlorothiazide 25mg,    please advise.     Last office visit and reason:  04/02/21, \"office note\"---------------------  From: Michelle Moore ([facility] Message Pool ([id number]_[facility]))   To: July Aparicio MD;     Sent: 12/2/2021 8:11:12 AM CST  Subject: FW: General Message---------------------  From: July Aparicio MD   To: [facility] Message Pool ([id number]_[place] [facility]);     Sent: 12/2/2021 1:22:38 PM CST  Subject: RE: General Message     ok to send in 90 day supply, suggest she requests recrds for her new provider once she finds one and best wishes!Pt notified and states understanding.  "

## 2022-02-15 NOTE — NURSING NOTE
Vital Signs Entered On:  4/2/2021 2:28 PM CDT    Performed On:  4/2/2021 2:28 PM CDT by Michelle Moore               Vital Signs   Systolic Blood Pressure :   141 mmHg (HI)    Diastolic Blood Pressure :   94 mmHg (HI)    Mean Arterial Pressure :   110 mmHg   BP Site :   Left arm   Peripheral Pulse Rate :   67 bpm   Michelle Moore - 4/2/2021 2:28 PM CDT

## 2022-02-15 NOTE — TELEPHONE ENCOUNTER
---------------------  From: Jennifer Koehler CMA   To: Sleep Message Pool ([id number]_[place] [facility] [place]);     Sent: 8/22/2019 11:03:22 AM CDT  Subject: cpap set up     Pt was in today following up recent sleep study. She is interested in starting cpap. I put in order for auto titrating cpap, can you help her to arrange set up.Waiting on provider notes from 08.22.19 visit.Left message for patient to discuss DME options for CPAP.Spoke with patient she will be going through [facility] for her CPAP machine and supplies. The following information was faxed to [facility]:    Provider order from Dr. Gardiner dated 08.22.19, provider notes from Dr. Aparicio dated 07.19.19, Dr. Gardiner dated 08.21.19, sleep study dated 08.15.19, demographic information and insurance card.

## 2022-02-15 NOTE — NURSING NOTE
Comprehensive Intake Entered On:  10/28/2021 7:58 AM CDT    Performed On:  10/28/2021 7:56 AM CDT by Ofe Moran CMA               Summary   Chief Complaint :   c/o sore throat,runny nose, lose of tates since 10/23/21;verbal consent given for telephone visit   Ofe Moran CMA - 10/28/2021 7:56 AM CDT   Health Status   Allergies Verified? :   Yes   Medication History Verified? :   Yes   Tobacco Use? :   Never smoker   Ofe Moran CMA - 10/28/2021 7:56 AM CDT   Consents   Consent for Immunization Exchange :   Consent Granted   Consent for Immunizations to Providers :   Consent Granted   Ofe Moran CMA - 10/28/2021 7:56 AM CDT

## 2022-02-15 NOTE — TELEPHONE ENCOUNTER
Entered by Katherine Price CMA on March 22, 2021 1:37:59 PM CDT  ---------------------  From: Katherine Price CMA   To: [facility] [id number] [facility]    Sent: 3/22/2021 1:37:59 PM CDT  Subject: Medication Management     ** Submitted: **  Order:hydroCHLOROthiazide (hydroCHLOROthiazide 25 mg oral tablet)  1 tab(s)  Oral  daily  Qty:  30 tab(s)        Days Supply:  30        Refills:  0          Substitutions Allowed     Route To Pharmacy - [place] [id number] [facility]    Signed by Katherine Price CMA  3/22/2021 6:37:00 PM [facility]    ** Submitted: **  Complete:hydroCHLOROthiazide (hydroCHLOROthiazide 25 mg oral tablet)   Signed by Katherine Price CMA  3/22/2021 6:37:00 PM [facility]    ** Not Approved:  **  hydroCHLOROthiazide (HYDROCHLOROTHIAZIDE 25 MG TAB)  TAKE 1 TABLET BY MOUTH EVERY DAY  Qty:  30 tab(s)        Days Supply:  30        Refills:  0          Substitutions Allowed     Route To Pharmacy - [facility] [id number] [facility]   Signed by Katherine Price CMA            ------------------------------------------  From: [facility] [id number] [facility] [employer]  To: July Aparicio MD  Sent: March 20, 2021 11:35:18 AM CDT  Subject: Medication Management  Due: March 18, 2021 11:58:04 AM CDT     ** On Hold Pending Signature **     Dispensed Drug: hydroCHLOROthiazide (hydroCHLOROthiazide 25 mg oral tablet), TAKE 1 TABLET BY MOUTH EVERY DAY  Quantity: 30 tab(s)  Days Supply: 30  Refills: 0  Substitutions Allowed  Notes from Pharmacy:  ------------------------------------------overdue for appt - protocol fill sent  portal msg sent 3/19/21

## 2022-02-15 NOTE — NURSING NOTE
Quick Intake Entered On:  9/5/2019 11:19 AM CDT    Performed On:  9/5/2019 11:17 AM CDT by Toyin Mary RN               Summary   Chief Complaint :   BP recheck   Systolic Blood Pressure :   136 mmHg (HI)    Diastolic Blood Pressure :   84 mmHg (HI)    Mean Arterial Pressure :   101 mmHg   Vital Signs Comments :   Advised f/u with [facility] soon and return for recheck next week   BP Site :   Right arm   BP Method :   Manual   Toyin Mary RN - 9/5/2019 11:18 AM CDT

## 2022-02-15 NOTE — NURSING NOTE
Comprehensive Intake Entered On:  7/19/2019 11:22 AM CDT    Performed On:  7/19/2019 11:16 AM CDT by Britany Goldberg CMA               Summary   Chief Complaint :   Follow up iron level, sleep study order and mammogram order   Weight Measured :   246.8 lb(Converted to: 246 lb 13 oz, 111.95 kg)    Height Measured :   65 in(Converted to: 5 ft 5 in, 165.10 cm)    Body Mass Index :   41.07 kg/m2 (HI)    Body Surface Area :   2.26 m2   Systolic Blood Pressure :   142 mmHg (HI)    Diastolic Blood Pressure :   82 mmHg (HI)    Mean Arterial Pressure :   102 mmHg   Peripheral Pulse Rate :   66 bpm   BP Site :   Right arm   Pulse Site :   Radial artery   BP Method :   Manual   HR Method :   Manual   Temperature Tympanic :   98.6 DegF(Converted to: 37.0 DegC)    Britany Goldberg CMA - 7/19/2019 11:16 AM CDT   Health Status   Allergies Verified? :   Yes   Medication History Verified? :   Yes   Medical History Verified? :   No   Pre-Visit Planning Status :   Completed   Tobacco Use? :   Never smoker   Britany Goldberg CMA - 7/19/2019 11:16 AM CDT   Meds / Allergies   (As Of: 7/19/2019 11:22:15 AM CDT)   Allergies (Active)   No Known Medication Allergies  Estimated Onset Date:   Unspecified ; Created By:   Niki Camargo CMA; Reaction Status:   Active ; Category:   Drug ; Substance:   No Known Medication Allergies ; Type:   Allergy ; Updated By:   Niki Camargo CMA; Reviewed Date:   7/19/2019 11:19 AM CDT        Medication List   (As Of: 7/19/2019 11:22:15 AM CDT)   Prescription/Discharge Order    lansoprazole  :   lansoprazole ; Status:   Prescribed ; Ordered As Mnemonic:   lansoprazole 30 mg oral delayed release capsule ; Simple Display Line:   30 mg, 1 cap(s), Oral, daily, 90 cap(s), 3 Refill(s) ; Ordering Provider:   July Aparicio MD; Catalog Code:   lansoprazole ; Order Dt/Tm:   3/29/2019 5:24:46 PM

## 2022-02-15 NOTE — TELEPHONE ENCOUNTER
Entered by Eduardo Cotto LPN on April 19, 2021 2:38:49 PM CDT  ---------------------  From: Eduardo Cotto LPN   To: [place] [id number] [place] [facility]    Sent: 4/19/2021 2:38:49 PM CDT  Subject: Medication Management     ** Submitted: **  Order:hydroCHLOROthiazide (hydroCHLOROthiazide 25 mg oral tablet)  1 tab(s)  Oral  daily  Qty:  90 tab(s)        Duration:  90 day(s)        Refills:  1          Substitutions Allowed     Route To Pharmacy - [place] [address] [place] [facility]    Signed by Eduardo Cotto LPN  4/19/2021 7:38:00 PM [facility]    ** Submitted: **  Complete:hydroCHLOROthiazide (hydroCHLOROthiazide 25 mg oral tablet)   Signed by Eduardo Cotto LPN  4/19/2021 7:38:00 PM [facility]    ** Not Approved:  **  hydroCHLOROthiazide (HYDROCHLOROTHIAZIDE 25 MG TAB)  TAKE 1 TABLET BY MOUTH EVERY DAY  Qty:  30 tab(s)        Days Supply:  30        Refills:  0          Substitutions Allowed     Route To Pharmacy - [place] [address] [facility]   Signed by Eduardo Cotto LPN            ------------------------------------------  From: [facility] [id number] [facility] [employer]  To: July Aparicio MD  Sent: April 16, 2021 12:31:27 PM CDT  Subject: Medication Management  Due: April 7, 2021 1:46:04 PM CDT     ** On Hold Pending Signature **     Dispensed Drug: hydroCHLOROthiazide (hydroCHLOROthiazide 25 mg oral tablet), TAKE 1 TABLET BY MOUTH EVERY DAY  Quantity: 30 tab(s)  Days Supply: 30  Refills: 0  Substitutions Allowed  Notes from Pharmacy:  ------------------------------------------Medication Refill    PCP:   Roger REYNA    Name of med requested:  _ HCTZ    Date of last office visit and reason:  _ 4-2-21      Date of last Med Check / Px:   _ 4-2-21    Date of last labs pertaining to med:  _ 4-2-21    RTC order in chart:  _ No    For Protocol refill, has patient been contacted:  _

## 2022-02-15 NOTE — PROGRESS NOTES
Chief Complaint    Follow up iron level, sleep study order and mammogram order  History of Present Illness      patient present to clinic today for follow up      She was noted to have an iron deficiency anemia and has been seen by GI and reports that she had an adenomatous polyp removed.  There is still not sure of the source of her bleeding so she is not sure when she is supposed to follow-up.  Did encourage patient to reach out to them to find out if they did in fact want to do the capsule camera study.  She is wondering if she should continue with her iron and would like to have her labs rechecked.      She does snore and has some hypersomnolence and is frustrated that she is having a hard time losing weight.  We have discussed in the past that perhaps she should consider evaluation for sleep study.  She is now ready to further investigate that.  She reports excessive daytime sleepiness, disturbed and restless sleep, feeling unrefreshed and nonrestorative sleep, excessive fatigue, insomnia, restless legs.  Additional relevant history is that her BMI is greater than 30, she has morning headaches, and she has hypertension.  Her Fillmore Sleepiness Scale is positive for moderate chance of dozing while watching TV or as a passenger in a car for an hour and a high chance of dozing if she sitting quietly after lunch or lying down to rest in the afternoon or sitting and reading which gives her AN Fillmore of 12.  She also reports some insomnia feeling sleepy throughout the day and headaches on awakening her main complaint is that she is sleepy all day she usually gets about 10 hours of sleep at night and thinks her problems have been going on for about 3 years.      Today she has an elevated blood pressure.  I rechecked this and the repeat was 158/104.  She occasionally gets headaches however she does not have one at this time, she has no chest pain, no orthopnea, no paroxysmal nocturnal dyspnea.      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  Despite this she is having a very hard time trying to lose any weight.      Review of systems is negative except as per HPI including:  no fevers, chills, sore throat, runny nose, nausea, vomiting, constipation, diarrhea, rash or new skin lesions, chest pain, palpitations, slurred speech, new paresthesia, shortness of breath or wheeze.      Exam:      General: alert and oriented ×3 no acute distress.      HEENT: pupils are equal round and reactive to light extraocular motion is intact. Normocephalic and atraumatic.       Hearing is grossly normal and there is no otorrhea.       Nares are patent there is no rhinorrhea.       Mucous membranes are moist and pink.      Chest: has bilateral rise with no increased work of breathing.      Cardiovascular: normal perfusion and brisk capillary refill.      Musculoskeletal: no gross focal abnormalities and normal gait.      Neuro: no gross focal abnormalities and memory seems intact.      Psychiatric: speech is clear and coherent and fluent. Patient dressed appropriately for the weather. Mood is appropriate and affect is full.      Skin is a little pale, she has good turgor.              Discussed with patient to return to clinic if symptoms worsen or do not improve  Physical Exam   Vitals & Measurements    T: 98.6   F (Tympanic)  HR: 66(Peripheral)  BP: 142/82     HT: 65 in  WT: 246.8 lb  BMI: 41.07   Assessment/Plan       Anemia (D64.9)         Ordered:          14997 office outpatient visit 25 minutes (Charge), Quantity: 1, Anemia  Snores  Obesity  Hypertension                Hypertension (I10)        We will plan to get patient started on some hydrochlorothiazide, would like her to return to clinic in 1 week to have her blood pressure rechecked and to have her electrolytes checked.         Ordered:          hydroCHLOROthiazide, = 1 tab(s) ( 25 mg  ), Oral, daily, # 90 tab(s), 3 Refill(s), Type: Maintenance, [facility]acy[id number], 1 tab(s) Oral daily, (Ordered)          49116 office outpatient visit 25 minutes (Charge), Quantity: 1, Anemia  Snores  Obesity  Hypertension                Obesity (E66.9)         Ordered:          47914 office outpatient visit 25 minutes (Charge), Quantity: 1, Anemia  Snores  Obesity  Hypertension                Snores (R06.83)        25 minutes was spent with patient in direct face-to-face contact of which greater than 50% time spent counseling patient coordinating care.  We discussed that I think to get the sleep study done because if she has poorly controlled sleep apnea been this can certainly cause her to have elevated cortisol levels which could make it harder for her to lose weight and may even contribute to her having weight gain.  In the meantime to continue to try to increase activity and monitor diet.         Ordered:          41995 office outpatient visit 25 minutes (Charge), Quantity: 1, Anemia  Snores  Obesity  Hypertension                Orders:         CBC (h/h, RBC, indices, WBC, Plt)* ([facility]), Specimen Type: Blood, Collection Date: 07/19/19 11:37:00 CDT         Ferritin* ([facility]), Specimen Type: Serum, Collection Date: 07/19/19 11:37:00 CDT         Iron, Total and Total Iron Binding Capacity* ([facility]), Specimen Type: Serum, Collection Date: 07/19/19 11:37:00 CDT         Return to Clinic (Request), RFV: css BP check and lab for BMP f/u HTN, Return in 1 week         Return to Clinic (Request), RFV: meet with Lindsay to discuss meds/TLC for treating diabetes  Patient Information     Name:LILLY PIZARROWILEY SALAZAR      Address:      [address] [place] [address]     Sex:Female     YOB: 1955     [phone]     Emergency Contact:ALEX PIZARRO     MRN:[id number]     FIN:[id number]     [facility]     Date of Service:07/19/2019      Primary Care Physician:        July Aparicio MD, [phone]      Attending Physician:       July Aparicio MD, [phone]  Problem List/Past Medical History    Ongoing     Anemia     Diverticulitis       Comments: Diverticulitis of descending colon     Diverticulitis of sigmoid colon       Comments: moderate on descending colon and sigmoid seen on colonoscopy 6/19     Hemorrhoid       Comments: internal and external seen on 6/19 colonoscopy     Hiatal hernia     History of colon polyps     Hypertension     Morbid obesity     Schatzki's ring       Comments: distal esophagus, dilated 06/01/2019    Historical     No qualifying data  Procedure/Surgical History     Colonoscopy (06/07/2019)            Comments: Indication:  Iron deviciency anemia.  Hemoccult positive stools.      Sedation:  MAC.      Impression:  One 3 mm sessile polyp in cecum.  Diverticulosis without diverticulitis.  External hemorrhoids.. Recommendation:  Repeat in 5 years,.     Esophagogastroduodenoscopy (06/07/2019)            Comments: Indication:  Iron deficiency anemia, dysphagia..     Cholecystectomy           Colonoscopy           Extraction of wisdom tooth           Hysterectomy           Tonsillectomy        Medications     lansoprazole 30 mg oral delayed release capsule: 30 mg, 1 cap(s), Oral, daily, 90 cap(s), 3 Refill(s).     hydroCHLOROthiazide 25 mg oral tablet: 25 mg, 1 tab(s), Oral, daily, 90 tab(s), 3 Refill(s).      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 Exercise type: Curves., 03/07/2019     Home/Environment      Marital status: ., 03/06/2019     Substance Abuse - Denies Substance Abuse, 03/07/2019     Tobacco - Denies Tobacco Use, 03/06/2019  Family  History    Arthritis: Father.    Diabetes mellitus type I: Mother and Sister.    Dyslipidemia: Father.    Heart disease: Sister.    High blood pressure: Father.    MI - Myocardial infarction: Father.    Smoker: Father.  Immunizations      Vaccine Date Status      influenza virus vaccine, inactivated 03/01/2019 Given      pneumococcal (PCV13) 03/01/2019 Given      tetanus/diphth/pertuss (Tdap) adult/adol 03/01/2019 Recorded      meningococcal conjugate vaccine 12/07/2007 Recorded      influenza virus vaccine, inactivated 12/07/2007 Recorded      tetanus/diphth/pertuss (Tdap) adult/adol 12/07/2007 Recorded

## 2022-02-15 NOTE — NURSING NOTE
General Medication Administration Entered On:  3/1/2019 1:56 PM CST    Performed On:  3/1/2019 1:55 PM CST by Niki Camargo CMA               General Medication Administration   Medication Name :   Mylanta   Medication Administration Quantity :   30ml   Route of Medication :   Oral    Units Administered :   30     Unit of Measure :   mL   Lot#/Manufacture :   [id number]   Expiration Date :   4/30/2020 CDT   Niki Camargo CMA - 3/1/2019 1:55 PM CST

## 2022-02-15 NOTE — TELEPHONE ENCOUNTER
---------------------  From: July Aparicio MD   To: Phone Messages Pool ([id number]_[place]);     Sent: 3/4/2019 4:13:33 PM CST  Subject: General Message         please see if we can add on the labs I've ordered today, thanks    Results:  Date Result Name Ind Value Ref Range   3/1/2019 3:21 PM Sodium Level  141 mmol/L (135 - 146)   3/1/2019 3:21 PM Potassium Level  4.3 mmol/L (3.5 - 5.3)   3/1/2019 3:21 PM Chloride Level  106 mmol/L (98 - 110)   3/1/2019 3:21 PM CO2 Level  28 mmol/L (20 - 32)   3/1/2019 3:21 PM Glucose Level ((H)) 110 mg/dL (65 - 99)   3/1/2019 3:21 PM BUN  13 mg/dL (7 - 25)   3/1/2019 3:21 PM Creatinine Level  0.91 mg/dL (0.50 - 0.99)   3/1/2019 3:21 PM BUN/Creat Ratio  NOT APPLICABLE (6 - 22)   3/1/2019 3:21 PM eGFR  66 mL/min/1.73m2 (> OR = 60 - )   3/1/2019 3:21 PM eGFR African American  77 mL/min/1.73m2 (> OR = 60 - )   3/1/2019 3:21 PM Calcium Level  9.1 mg/dL (8.6 - 10.4)   3/1/2019 3:21 PM Bilirubin Total  0.3 mg/dL (0.2 - 1.2)   3/1/2019 3:21 PM Alkaline Phosphatase  79 unit/L (33 - 130)   3/1/2019 3:21 PM AST/SGOT  12 unit/L (10 - 35)   3/1/2019 3:21 PM ALT/SGPT  11 unit/L (6 - 29)   3/1/2019 3:21 PM Protein Total  6.7 gm/dL (6.1 - 8.1)   3/1/2019 3:21 PM Albumin Level  4.1 gm/dL (3.6 - 5.1)   3/1/2019 3:21 PM Globulin  2.6 (1.9 - 3.7)   3/1/2019 3:21 PM A/G Ratio  1.6 (1.0 - 2.5)   3/1/2019 3:21 PM Hgb A1c ((H)) 6.3 ( - <5.7)   3/1/2019 3:21 PM LDL Direct ((H)) 131 mg/dL ( - <100)   3/1/2019 3:21 PM TSH  2.18 mIU/L (0.40 - 4.50)   3/1/2019 3:21 PM WBC  5.7 (3.8 - 10.8)   3/1/2019 3:21 PM RBC  4.59 (3.80 - 5.10)   3/1/2019 3:21 PM Hgb ((L)) 8.9 gm/dL (11.7 - 15.5)   3/1/2019 3:21 PM Hct ((L)) 30.9 % (35.0 - 45.0)   3/1/2019 3:21 PM MCV ((L)) 67.3 fL (80.0 - 100.0)   3/1/2019 3:21 PM MCH ((L)) 19.4 pg (27.0 - 33.0)   3/1/2019 3:21 PM MCHC ((L)) 28.8 gm/dL (32.0 - 36.0)   3/1/2019 3:21 PM RDW ((H)) 15.9 % (11.0 - 15.0)   3/1/2019 3:21 PM Platelet  325 (140 - 400)   3/1/2019 3:21 PM  MPV  9.9 fL (7.5 - 12.5)---------------------  From: Monalisa Beltran CMA (Phone Messages Pool ([id number]_[facility]))   To: Markus NGUYEN July;     Sent: 3/4/2019 4:33:58 PM CST  Subject: RE: General Message     Confirmed the orders from today can be added. Additional testing form completed and delivered to lab.

## 2022-02-15 NOTE — TELEPHONE ENCOUNTER
---------------------  From: Seema Cornelius LPN (Phone Messages Pool ([id number]))   To: Phone Messages Pool ([address] [place] [address]);     Sent: 7/3/2020 12:00:22 PM CDT  Subject: med refill     Phone Message    PCP:    MARIBELL    Time of Call:  10:36am       Person Calling:  pt  Phone number:  [phone] OK to LM     Returned call at: 11:57am    Note:   Pt LM stating she has no refills on her hydrochlorothiazide 25mg.    Returned call and LM for pt to call back- Per [facility] note in 10/4/19 pt was switched from hydrochlorothiazide to losartan.  Asked pt to call back and let us know if she has been taking both HCTZ and losartan.    Last office visit and reason:  10/4/19 Office Visit Note---------------------  From: Seema Cornelius LPN (Phone Messages Pool ([id number]))   To: Advanced Practice Provider Pool ([address] [place] [address]);     Sent: 7/3/2020 1:17:36 PM CDT  Subject: FW: med refill     Pt LM at 12:34pm.  Returned call and spoke with pt. Pt says she only took the losartan for about 1 week and had side effects so she stopped taking it and went back to hydrochlorothiazide. Pt does not remember what the side effects were.    Please advise on hydrochlorothiazide Rx as it is no longer on pt's medication list.---------------------  From: Dary Nassar (Advanced Practice Provider Pool ([address] [place] [address]))   To: Phone MessageOne Pool ([address] [place] [address]);     Sent: 7/3/2020 1:26:50 PM CDT  Subject: RE: med refill     please give 14 day supply of HCTZ 25 mg daily and she can address with DR Aparicio when she returnsRx sent and pt notified.---------------------  From: Seema Cornelius LPN (Phone Messages Pool ([id number]_[facility]))   To: [facility] Message Pool ([id number]_[place]);     Sent: 7/3/2020 1:29:19 PM CDT  Subject: FW: med refill---------------------  From: Niki Good CMA ([facility] Message Pool ([id number]_[facility]))   To: July Aparicio MD;     Sent: 7/7/2020 8:09:00 AM CDT  Subject:  FW: med refill---------------------  From: July Aparicio MD   To: [facility] Message Pool ([id number]_[place]);     Sent: 7/16/2020 3:55:03 PM CDT  Subject: RE: med refill     ok to send a 30 day sup[ply of hctz 25 mg 1 po qday, wouldlike her to rtc for lab only appt in about 3 weeks to recheck kidneys, will need to monitor her kidney function aS hctz SEEMED TO AFFECT HER KIDNEYS when we checked it lastPt notified at 1614- transferred to scheduling for lab only. Rx sent.

## 2022-02-15 NOTE — LETTER
(Inserted Image. Unable to display)     August 05, 2019      LEATHA PIZARRO  [address] [place], [place] [id number]          Dear LEATHA,      Thank you for selecting [facility] ([facility] for your healthcare needs.    Based on recent lab work you had done, it appears that your blood sugar is slightly above normal and you are at risk for developing type 2 diabetes.    The good news is that you can take steps to delay or prevent type 2 diabetes.     Your Healthcare Provider has recommended that you schedule a pre-diabetes education appointment* with our Certified Diabetes Educator, Isatu Leach RD, CD, CDE, or attend a free educational session on managing pre-diabetes. The class schedule is enclosed.      If covered by your insurance, your appointment will be ~45   60 minutes and will include education on ways to help you prevent getting diabetes in the future. If you prefer or your insurance doesn t cover an appointment* with a diabetes educator, the class will cover topics such as healthy eating, being active, lab results and weight loss strategies (if needed).       To schedule an appointment or if you have further questions, please contact your primary clinic:   [facility]       [phone]   [facility]             [phone]               [facility]        [phone]      *If you have questions about insurance coverage, please contact your insurance company prior to your appointment. Medicare does not pay for an individual clinic appointment for medical nutrition therapy without the diagnosis of diabetes.        Powered by [employer]    Sincerely,    Providers at [facility]  
(Inserted Image. Unable to display)     July 29, 2019      LEATHA PIZARRO  [address] [place] [id number]          Dear LEATHA,      Thank you for selecting [facility] ([facility] for your healthcare needs.      Our records indicate you are due for the following services:     Clinical Support Staff (CSS)-Only Blood Pressure Check ~ Please stop in anytime to have your blood pressure rechecked. This is a free service and no appointment necessary.     So we can best determine if your medications are effective in lowering your blood pressure, please make sure your blood pressure medicine has been in your system for at least 1-2 hours prior to coming in.  We encourage you to avoid caffeine or other stimulants prior to having your blood pressure checked and come at a time when you are not feeling rushed.     If you check your blood pressure at home, please bring in your blood pressure monitor and home blood pressure readings.  We will check your machine for accuracy and also share your home readings with your Healthcare Provider.       To schedule an appointment or if you have further questions, please contact your primary clinic:   [facility]       [phone]   Critical access hospital       [phone]              [facility]     [phone]      Powered by [employer] and [employer]    Sincerely,    July Aparicio MD  
(Inserted Image. Unable to display)   August 28, 2019        LEATHA PIZARRO  [address] [place], [place] [id number]        Dear LEATHA,      Thank you for selecting [facility] for your healthcare needs.    Our records indicate you are due for the following services:     Non-Fasting Labs    If you had your labs done at another facility or with Direct Access Lab Testing at [facility], please bring in a copy of the results to your next visit, mail a copy, or drop off a copy of your results to your Healthcare Provider.    To schedule an appointment or if you have further questions, please contact your primary clinic:   [facility]       [phone]   [facility]       [phone]              [facility]     [phone]      Powered by [employer] and [employer]    Sincerely,    July Aparicio MD  
Appropriate/Calm

## 2022-02-15 NOTE — PROGRESS NOTES
Chief Complaint    F/u labs. Pt also c/o cough due to starting omeprazole.  History of Present Illness          HPI:          Pt present to clinic today with facial pain and cough.           She had an upper respiratory infection following a trip and had slowly been getting better but now things seem to have worsened again.  She is coughing frequently.  She is also been having problems with her heartburn.  This may be contributing to the cough.  We have tried to start her on to some omeprazole however she felt like her cough and her heartburn worsened.  She says her sister was taking Dexilant and would like to give this a try.  In regards to her facial pain is now localized to the right cheek and she also reports a history of a fall resulting in a broken nose and says she has had some chronic sinus problems ever since.  She thinks that she might have a septal deviation.      We originally had this appointment scheduled so that we can follow-up on her labs.  Her labs show an iron deficiency anemia.  She is postmenopausal and is due for colonoscopy for colon cancer screening.  Given this iron deficiency anemia it certainly does seem like a colonoscopy is appropriate.  She likely should also get an EGD as it has been difficult to control her reflux and seems to be contributing to a chronic cough.                     ROS: Negative except as per HPI.                     Exam:          GEN: alert and oriented x 3 in no acute distress          HEENT:          Head: normo-cephalic and atraumatic                     Eyes: PERRL, EOMI, conjunctiva not injected, no scleral icterus                     Ears:  hearing grossly normal, no otorrhea, TM dull grey with no light reflex                     Nose: no rhinorrhea and positive boggy palenasal mucosa, right nasal septal deviation.                     Sinus: maxillary right tender                       ethmoid nontender                       frontal nontender                       Mouth: mucous membranes moist and pink, positive pharyngeal cobblestoning                     NECK:  supple, no anterior cervical or supraclavicular lymphadenopathy, no nuchal ridgidity                     CHEST: has bilateral rise with no increased work of breathing. clear to auscultation without wheezes, rhonchi, or rales.                     CARDIOVASCULAR: normal perfusion and brisk capillary refill. S1S2 with regular rate and rhythm and no murmurs, gallops or rubs.                     MUSCULOSKELETAL: no gross focal abnormalities and normal gait.                     Neuro: no gross focal abnormalities and memory seems intact.                     Psychiatric: speech is clear and coherent and fluent. Patient dressed appropriately for the weather. Mood is appropriate and affect is full.                      Physical Exam   Vitals & Measurements    T: 97.4   F (Tympanic)  HR: 60(Peripheral)  BP: 124/76     HT: 65 in  WT: 241 lb  BMI: 40.1   Assessment/Plan       Chronic GERD (K21.9)        Sent over prescription for Dexilant if she finds that this is cost prohibitive we may consider Protonix instead.         Ordered:          89354 office outpatient visit 25 minutes (Charge), Quantity: 1, Iron deficiency anemia  Right maxillary sinusitis  Chronic GERD          Referral (Request), 03/15/19 13:02:00 CDT, Referred to: Gastroenterology, Chronic GERD  Iron deficiency anemia                Iron deficiency anemia (D50.9)         Ordered:          91550 office outpatient visit 25 minutes (Charge), Quantity: 1, Iron deficiency anemia  Right maxillary sinusitis  Chronic GERD          Colonoscopy (Request), Iron deficiency anemia          EGD* (Request), Iron deficiency anemia          Referral (Request), 03/15/19 13:02:00 CDT, Referred to: Gastroenterology, Chronic GERD  Iron deficiency anemia                Right maxillary sinusitis (J32.0)         Ordered:          amoxicillin, = 2 cap(s) ( 1,000 mg ), PO, TID, #  60 cap(s), 0 Refill(s), Type: Maintenance, Pharmacy: [id number], 2 cap(s) Oral tid,x10 day(s), (Ordered)          75943 office outpatient visit 25 minutes (Charge), Quantity: 1, Iron deficiency anemia  Right maxillary sinusitis  Chronic GERD                Orders:         dexlansoprazole, = 1 cap(s) ( 60 mg ), Oral, daily, # 30 cap(s), 11 Refill(s), Type: Maintenance, [id number], 1 cap(s) Oral daily, (Ordered)         XR Shoulder Complete Left (Request), Shoulder pain  Patient Information     Name:LEATHA PIZARRO      Address:      [address] [place] [id number]     Sex:Female     YOB: 1955     [phone]     Emergency Contact:ALEX PIZARRO     MRN:[id number]     FIN:[id number]     Location:[facility]     Date of Service:03/15/2019      Primary Care Physician:       July Aparicio MD, [phone]      Attending Physician:       July Aparicio MD, [phone]  Problem List/Past Medical History    Ongoing     Hiatal hernia     History of colon polyps     Morbid obesity    Historical     No qualifying data  Procedure/Surgical History     Cholecystectomy           Colonoscopy           Extraction of wisdom tooth           Hysterectomy           Tonsillectomy        Medications     Dexilant 60 mg oral delayed release capsule: 60 mg, 1 cap(s), Oral, daily, 30 cap(s), 11 Refill(s).     amoxicillin 500 mg oral capsule: 1,000 mg, 2 cap(s), PO, TID, for 10 day(s), 60 cap(s), 0 Refill(s).          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 Exercise type: Curves., 03/07/2019     Home and Environment      Marital status:  ., 03/06/2019     Substance Abuse - Denies Substance Abuse, 03/07/2019     Tobacco - Denies Tobacco Use, 03/06/2019  Family History    Arthritis: Father.    Diabetes mellitus type I: Mother and Sister.    Dyslipidemia: Father.    Heart disease: Sister.    High blood pressure: Father.    MI - Myocardial infarction: Father.    Smoker: Father.  Immunizations      Vaccine Date Status      influenza virus vaccine, inactivated 03/01/2019 Given      pneumococcal (PCV13) 03/01/2019 Given      tetanus/diphth/pertuss (Tdap) adult/adol 03/01/2019 Recorded      meningococcal conjugate vaccine 12/07/2007 Recorded      influenza virus vaccine, inactivated 12/07/2007 Recorded      tetanus/diphth/pertuss (Tdap) adult/adol 12/07/2007 Recorded  Lab Results       Lab Results (Last 4 results within 90 days)        Sodium Level: 141 mmol/L (03/01/19 15:21:00)       Potassium Level: 4.3 mmol/L (03/01/19 15:21:00)       Chloride Level: 106 mmol/L (03/01/19 15:21:00)       CO2 Level: 28 mmol/L (03/01/19 15:21:00)       Glucose Level: 110 mg/dL High (03/01/19 15:21:00)       BUN: 13 mg/dL (03/01/19 15:21:00)       Creatinine Level: 0.91 mg/dL (03/01/19 15:21:00)       BUN/Creat Ratio: NOT APPLICABLE (03/01/19 15:21:00)       eGFR: 66 mL/min/1.73m2 (03/01/19 15:21:00)       eGFR : 77 mL/min/1.73m2 (03/01/19 15:21:00)       Calcium Level: 9.1 mg/dL (03/01/19 15:21:00)       Bilirubin Total: 0.3 mg/dL (03/01/19 15:21:00)       Alkaline Phosphatase: 79 unit/L (03/01/19 15:21:00)       AST/SGOT: 12 unit/L (03/01/19 15:21:00)       ALT/SGPT: 11 unit/L (03/01/19 15:21:00)       Protein Total: 6.7 gm/dL (03/01/19 15:21:00)       Albumin Level: 4.1 gm/dL (03/01/19 15:21:00)       Globulin: 2.6 (03/01/19 15:21:00)       A/G Ratio: 1.6 (03/01/19 15:21:00)       Hgb A1c: 6.3 High (03/01/19 15:21:00)       LDL Direct: 131 mg/dL High (03/01/19 15:21:00)       TSH: 2.18 mIU/L (03/01/19 15:21:00)       Iron Level: 14 mcg/dL Low  (03/01/19 15:21:00)       TIBC: 438 (03/01/19 15:21:00)       Iron Saturation: 3 Low (03/01/19 15:21:00)       Ferritin: 5 ng/mL Low (03/01/19 15:21:00)       Transferrin: 339 mg/dL (03/01/19 15:21:00)       WBC: 5.7 (03/01/19 15:21:00)       RBC: 4.59 (03/01/19 15:21:00)       Hgb: 8.9 gm/dL Low (03/01/19 15:21:00)       Hct: 30.9 % Low (03/01/19 15:21:00)       MCV: 67.3 fL Low (03/01/19 15:21:00)       MCH: 19.4 pg Low (03/01/19 15:21:00)       MCHC: 28.8 gm/dL Low (03/01/19 15:21:00)       RDW: 15.9 % High (03/01/19 15:21:00)       Platelet: 325 (03/01/19 15:21:00)       MPV: 9.9 fL (03/01/19 15:21:00)       Test in Question - Test(s) Ordered: FERRITIN IRON AND TOTAL IRON (03/01/19 15:21:00)       Misc Test CPT Code: 457SB 7573SB 891SB (03/01/19 15:21:00)       Misc Test Client Contact: JESUS SUERO (03/01/19 15:21:00)       Misc Test Comment: See comment (03/01/19 15:21:00)       Misc Test Comment: See comment (03/01/19 15:21:00)

## 2022-02-15 NOTE — NURSING NOTE
Comprehensive Intake Entered On:  3/15/2019 11:59 AM CDT    Performed On:  3/15/2019 11:54 AM CDT by Monalisa Paz               Summary   Chief Complaint :   F/u labs. Pt also c/o cough due to starting omeprazole.    Weight Measured :   241 lb(Converted to: 241 lb 0 oz, 109.32 kg)    Height Measured :   65 in(Converted to: 5 ft 5 in, 165.10 cm)    Body Mass Index :   40.1 kg/m2 (HI)    Body Surface Area :   2.24 m2   Systolic Blood Pressure :   124 mmHg   Diastolic Blood Pressure :   76 mmHg   Mean Arterial Pressure :   92 mmHg   Peripheral Pulse Rate :   60 bpm   Temperature Tympanic :   97.4 DegF(Converted to: 36.3 DegC)  (LOW)    Monalisa Paz - 3/15/2019 11:54 AM CDT   Health Status   Allergies Verified? :   Yes   Medication History Verified? :   Yes   Medical History Verified? :   Yes   Pre-Visit Planning Status :   Completed   Tobacco Use? :   Never smoker   Monalisa Paz - 3/15/2019 11:54 AM CDT   Meds / Allergies   (As Of: 3/15/2019 11:59:14 AM CDT)   Allergies (Active)   No Known Medication Allergies  Estimated Onset Date:   Unspecified ; Created By:   Niki Camargo CMA; Reaction Status:   Active ; Category:   Drug ; Substance:   No Known Medication Allergies ; Type:   Allergy ; Updated By:   Niki Camargo CMA; Reviewed Date:   3/1/2019 1:02 PM CST        Medication List   (As Of: 3/15/2019 11:59:14 AM CDT)   Prescription/Discharge Order    omeprazole  :   omeprazole ; Status:   Prescribed ; Ordered As Mnemonic:   omeprazole 40 mg oral delayed release capsule ; Simple Display Line:   40 mg, 1 cap(s), PO, Daily, 90 cap(s), 3 Refill(s) ; Ordering Provider:   July Aparicio MD; Catalog Code:   omeprazole ; Order Dt/Tm:   3/1/2019 1:52:07 PM

## 2022-02-15 NOTE — LETTER
(Inserted Image. Unable to display)       August 14, 2020      LEATHA PIZARRO  [address] [facility]  [place] [id number]        Dear LEATHA,     Thank you for selecting [facility] for your healthcare needs. Below you will find the results of your recent test(s) done at our clinic.      You don't have anemia, and your Ferritin is normal.  However, if you can supplement your iron and get your ferritin above 70 you might find that you feel better.  It can improve concentration and reduce restless leg symptoms.      Result Name Current Result Previous Result Reference Range   Sodium Level (mmol/L)  139 8/5/2020  140 9/5/2019 135 - 146   Potassium Level (mmol/L)  3.9 8/5/2020  3.9 9/5/2019 3.5 - 5.3   Chloride Level (mmol/L)  102 8/5/2020  103 9/5/2019 98 - 110   CO2 Level (mmol/L)  29 8/5/2020  30 9/5/2019 20 - 32   Glucose Level (mg/dL)  90 8/5/2020  97 9/5/2019 65 - 99   BUN (mg/dL)  20 8/5/2020  19 9/5/2019 7 - 25   Creatinine Level (mg/dL)  0.90 8/5/2020 ((H)) 1.04 9/5/2019 0.50 - 0.99   BUN/Creat Ratio  NOT APPLICABLE 8/5/2020  18 9/5/2019 6 - 22   eGFR (mL/min/1.73m2)  67 8/5/2020 ((L)) 56 9/5/2019 > OR = 60 -    eGFR  (mL/min/1.73m2)  77 8/5/2020  65 9/5/2019 > OR = 60 -    Calcium Level (mg/dL)  10.1 8/5/2020  10.0 9/5/2019 8.6 - 10.4   Phosphorus Level (mg/dL)  3.5 8/5/2020  2.1 - 4.3   Albumin Level (gm/dL)  4.2 8/5/2020  4.1 3/1/2019 3.6 - 5.1   Iron Level (mcg/dL)  91 8/5/2020  72 7/19/2019 45 - 160   TIBC  360 8/5/2020  416 7/19/2019 250 - 450   Iron Saturation  25 8/5/2020  17 7/19/2019 16 - 45   Ferritin (ng/mL)  20 8/5/2020  27 7/19/2019 16 - 288   WBC  5.4 8/5/2020  6.7 7/19/2019 3.8 - 10.8   RBC ((H)) 5.34 8/5/2020 ((H)) 5.25 7/19/2019 3.80 - 5.10   Hgb (gm/dL)  14.9 8/5/2020  12.8 7/19/2019 11.7 - 15.5   Hct (%) ((H)) 45.7 8/5/2020  41.1 7/19/2019 35.0 - 45.0   MCV (fL)  85.6 8/5/2020 ((L)) 78.3 7/19/2019 80.0 - 100.0   MCH (pg)  27.9 8/5/2020 ((L)) 24.4 7/19/2019 27.0  - 33.0   MCHC (gm/dL)  32.6 8/5/2020 ((L)) 31.1 7/19/2019 32.0 - 36.0   RDW (%)  13.0 8/5/2020 ((H)) 17.2 7/19/2019 11.0 - 15.0   Platelet  229 8/5/2020  315 7/19/2019 140 - 400   MPV (fL)  11.8 8/5/2020  10.8 7/19/2019 7.5 - 12.5     Please contact my practice at [phone] if you have any questions or concerns.     Sincerely,        July Aparicio MD      What do your labs mean?  Below is a glossary of commonly ordered labs:  LDL   Bad Cholesterol   HDL   Good Cholesterol  AST/ALT   Liver Function   Cr/Creatinine   Kidney Function  Microalbumin   Kidney Function  BUN   Kidney Function  PSA   Prostate    TSH   Thyroid Hormone  HgbA1c   Diabetes Test   Hgb (Hemoglobin)   Red Blood Cells

## 2022-02-15 NOTE — TELEPHONE ENCOUNTER
Entered by Jie Ibarra MA on February 22, 2021 9:58:07 AM CST  ---------------------  From: Jie Ibarra MA   To: [facility] [id number] [facility]    Sent: 2/22/2021 9:58:07 AM CST  Subject: Medication Management     ** Submitted: **  Order:hydroCHLOROthiazide (hydroCHLOROthiazide 25 mg oral tablet)  1 tab(s)  Oral  daily  Qty:  30 tab(s)        Refills:  0          Substitutions Allowed     Route To Pharmacy - [place] [address] [facility]    Signed by Jie Ibarra MA  2/22/2021 3:57:00 PM [facility]    ** Submitted: **  Complete:hydroCHLOROthiazide (hydroCHLOROthiazide 25 mg oral tablet)   Signed by Jie Ibarra MA  2/22/2021 3:58:00 PM [facility]    ** Not Approved:  **  hydroCHLOROthiazide (HYDROCHLOROTHIAZIDE 25 MG TAB)  TAKE 1 TABLET BY MOUTH EVERY DAY  Qty:  90 tab(s)        Days Supply:  90        Refills:  1          Substitutions Allowed     Route To Pharmacy - [place] [address] [facility]   Signed by Jie Ibarra MA            ------------------------------------------  From: [place] [id number] [facility]  To: Gelacio John PA-C  Sent: February 19, 2021 12:30:23 AM CST  Subject: Medication Management  Due: February 19, 2021 9:56:59 PM CST     ** On Hold Pending Signature **     Dispensed Drug: hydroCHLOROthiazide (hydroCHLOROthiazide 25 mg oral tablet), TAKE 1 TABLET BY MOUTH EVERY DAY  Quantity: 90 tab(s)  Days Supply: 90  Refills: 1  Substitutions Allowed  Notes from Pharmacy:  ------------------------------------------Med Refill      Date of last office visit and reason:  10/4/19 w/ MARIBELL for HTN f/u      Date of last Med Check / Px:   _  Date of last labs pertaining to med:  8/5/2020    Note:  _    RTC order in chart:  RTC placed due now    For Protocol refill, has patient been contacted:  message sent to pharmacy

## 2022-02-15 NOTE — TELEPHONE ENCOUNTER
---------------------  From: July Aparicio MD   To: Phone Messages Pool ([id number] - [place]);     Sent: 9/6/2019 6:16:35 PM CDT  Subject: General Message     please invite patient to return to clinic to discuss her hypertension and her BP meds thanks        Results:  Date Result Name Ind Value Ref Range   9/5/2019 10:57 AM Sodium Level  140 mmol/L (135 - 146)   9/5/2019 10:57 AM Potassium Level  3.9 mmol/L (3.5 - 5.3)   9/5/2019 10:57 AM Chloride Level  103 mmol/L (98 - 110)   9/5/2019 10:57 AM CO2 Level  30 mmol/L (20 - 32)   9/5/2019 10:57 AM Glucose Level  97 mg/dL (65 - 139)   9/5/2019 10:57 AM BUN  19 mg/dL (7 - 25)   9/5/2019 10:57 AM Creatinine Level ((H)) 1.04 mg/dL (0.50 - 0.99)   9/5/2019 10:57 AM BUN/Creat Ratio  18 (6 - 22)   9/5/2019 10:57 AM eGFR ((L)) 56 mL/min/1.73m2 (> OR = 60 - )   9/5/2019 10:57 AM eGFR African American  65 mL/min/1.73m2 (> OR = 60 - )   9/5/2019 10:57 AM Calcium Level  10.0 mg/dL (8.6 - 10.4)left voicemail for patient to return to clinic to discuss HTNPt LM at 1059 in regards to not getting results. Spoke to her at 1118 and advised appt to which she agreed and was transferred to [facility].

## 2022-02-15 NOTE — PROGRESS NOTES
Chief Complaint    f/u HST.  History of Present Illness      63-year-old following up after having a home sleep test.      15 August patient underwent a home sleep test and had an AHI of about 20/h.  Findings were consistent with moderate sleep apnea.  She has symptoms of daytime sleepiness.  She has been witnessed to have apnea episodes at night.  Says she definitely is tired and never really feels like she gets good sleep  Review of Systems      See HPI.  All other review of systems negative.  Physical Exam   Vitals & Measurements    HR: 68(Peripheral)  BP: 156/96     WT: 244 lb       Alert and oriented       Oropharynx slightly prominent tongue difficult to visualize her uvula normal oromucosa no thyromegaly none labored respirations  Assessment/Plan       Moderate obstructive sleep apnea (G47.33)        I discussed the findings of her sleep test and the significance to her health        I reviewed treatment options and she would like to try auto titrating CPAP and will follow-up in 1 to 2 months or sooner if there is any problems   Patient Information     Name:LEATHA PIZARRO      Address:      [address] [place] [id number]     Sex:Female     YOB: 1955     [phone]     Emergency Contact:ALEX PIZARRO     MRN:[id number]     FIN:[id number]     Location:[facility]     Date of Service:08/22/2019      Primary Care Physician:       July Aparicio MD, [phone]      Attending Physician:       Vernon Gardiner MD, [phone]  Problem List/Past Medical History    Ongoing     Anemia     Diverticulitis       Comments: Diverticulitis of descending colon     Diverticulitis of sigmoid colon       Comments: moderate on descending colon and sigmoid seen on colonoscopy 6/19     Hemorrhoid       Comments: internal and external seen on 6/19 colonoscopy     Hiatal hernia     History of colon polyps     Hypertension     Moderate obstructive sleep apnea     Morbid  obesity     Schatzki's ring       Comments: distal esophagus, dilated 06/01/2019    Historical     No qualifying data  Procedure/Surgical History     Polysomnogram (08/15/2019)      Comments: AHI: 17.9.     Colonoscopy (06/07/2019)      Comments: Indication:  Iron deviciency anemia.  Hemoccult positive stools.      Sedation:  MAC.      Impression:  One 3 mm sessile polyp in cecum.  Diverticulosis without diverticulitis.  External hemorrhoids.. Recommendation:  Repeat in 5 years,.     Esophagogastroduodenoscopy (06/07/2019)      Comments: Indication:  Iron deficiency anemia, dysphagia..     Adenomatous polyp      Comments: colonoscopy biopsy.     Cholecystectomy     Colonoscopy     Extraction of wisdom tooth     Hysterectomy     Tonsillectomy  Medications    Auto Titrating CPAP 6-16 for daily use, See Instructions, 11 refills    hydroCHLOROthiazide 25 mg oral tablet, 25 mg= 1 tab(s), Oral, daily, 3 refills,   Not taking    lansoprazole 30 mg oral delayed release capsule, 30 mg= 1 cap(s), Oral, daily, 3 refills  Allergies    No Known Medication Allergies  Social History    Smoking Status - 07/19/2019     Never smoker     Alcohol      Current, 0-1 TIME PER WEEK, 03/04/2019     Employment/School      Employed, Work/School description: Sales., 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 Exercise type: Cur[employer]., 03/07/2019     Home/Environment      Marital status: ., 03/06/2019     Substance Abuse - Denies Substance Abuse, 03/07/2019     Tobacco - Denies Tobacco Use, 03/06/2019  Family History    Arthritis: Father.    Diabetes mellitus type I: Mother and Sister.    Dyslipidemia: Father.    Heart disease: Sister.    High blood pressure: Father.    MI - Myocardial infarction: Father.    Smoker: Father.  Immunizations      Vaccine Date Status      influenza virus vaccine, inactivated 03/01/2019 Given      pneumococcal (PCV13) 03/01/2019 Given       tetanus/diphth/pertuss (Tdap) adult/adol 03/01/2019 Recorded      meningococcal conjugate vaccine 12/07/2007 Recorded      hepatitis B pediatric vaccine 12/07/2007 Recorded      influenza virus vaccine, inactivated 12/07/2007 Recorded      tetanus/diphth/pertuss (Tdap) adult/adol 12/07/2007 Recorded  Lab Results          Lab Results (Last 4 results within 90 days)           Iron Level: 72 mcg/dL [45 mcg/dL - 160 mcg/dL] (07/19/19 12:25:00)          TIBC: 416 [250  - 450] (07/19/19 12:25:00)          Iron Saturation: 17 [16  - 45] (07/19/19 12:25:00)          Ferritin: 27 ng/mL [16 ng/mL - 288 ng/mL] (07/19/19 12:25:00)          WBC: 6.7 [3.8  - 10.8] (07/19/19 12:25:00)          RBC: 5.25 High [3.8  - 5.1] (07/19/19 12:25:00)          Hgb: 12.8 gm/dL [11.7 gm/dL - 15.5 gm/dL] (07/19/19 12:25:00)          Hct: 41.1 % [35 % - 45 %] (07/19/19 12:25:00)          MCV: 78.3 fL Low [80 fL - 100 fL] (07/19/19 12:25:00)          MCH: 24.4 pg Low [27 pg - 33 pg] (07/19/19 12:25:00)          MCHC: 31.1 gm/dL Low [32 gm/dL - 36 gm/dL] (07/19/19 12:25:00)          RDW: 17.2 % High [11 % - 15 %] (07/19/19 12:25:00)          Platelet: 315 [140  - 400] (07/19/19 12:25:00)          MPV: 10.8 fL [7.5 fL - 12.5 fL] (07/19/19 12:25:00)

## 2022-02-15 NOTE — LETTER
(Inserted Image. Unable to display)     April 01, 2019      LEATHA PIZARRO  [address] [place] [phone]          Dear LEATHA,      Thank you for selecting [facility] for your healthcare needs.      Our records indicate you are due for the following services:     Follow-up office visit.      To schedule an appointment or if you have further questions, please contact your primary clinic:   [facility]       [phone]   [facility]       [phone]              [facility]     [phone]      Powered by [employer]    Sincerely,    July Aparicio MD

## 2022-02-15 NOTE — CARE COORDINATION
Pt appears on  P chronic disease panel as out of parameters for elevated BP.  RTC placed for CSS only BP check.  Diane Owens CMA.

## 2022-02-15 NOTE — NURSING NOTE
CAGE Assessment Entered On:  3/4/2019 8:44 AM CST    Performed On:  3/1/2019 8:44 AM CST by Naila Darby               Assessment   Have you ever felt you should cut down on your drinking :   No   Have people annoyed you by criticizing your drinking :   No   Have you ever felt bad or guilty about your drinking :   No   Have you ever taken a drink first thing in the morning to steady your nerves or get rid of a hangover (Eye-opener) :   No   CAGE Score :   0    Naila Darby - 3/4/2019 8:44 AM CST

## 2022-02-15 NOTE — NURSING NOTE
Comprehensive Intake Entered On:  10/4/2019 1:44 PM CDT    Performed On:  10/4/2019 1:40 PM CDT by Niki Camargo CMA               Summary   Chief Complaint :   HTN check    Weight Measured :   237.8 lb(Converted to: 237 lb 13 oz, 107.86 kg)    Systolic Blood Pressure :   124 mmHg   Diastolic Blood Pressure :   70 mmHg   Mean Arterial Pressure :   88 mmHg   Peripheral Pulse Rate :   77 bpm   BP Site :   Right arm   BP Method :   Manual   HR Method :   Electronic   Oxygen Saturation :   97 %   Niki Camargo CMA - 10/4/2019 1:40 PM CDT   Health Status   Allergies Verified? :   Yes   Medication History Verified? :   Yes   Pre-Visit Planning Status :   Completed   Tobacco Use? :   Never smoker   Niki Camargo CMA - 10/4/2019 1:40 PM CDT   Consents   Consent for Immunization Exchange :   Consent Granted   Consent for Immunizations to Providers :   Consent Granted   Niki Camargo CMA - 10/4/2019 1:40 PM CDT   Meds / Allergies   (As Of: 10/4/2019 1:44:08 PM CDT)   Allergies (Active)   No Known Medication Allergies  Estimated Onset Date:   Unspecified ; Created By:   Niki Camargo CMA; Reaction Status:   Active ; Category:   Drug ; Substance:   No Known Medication Allergies ; Type:   Allergy ; Updated By:   Niki Camargo CMA; Reviewed Date:   10/3/2019 10:20 AM CDT        Medication List   (As Of: 10/4/2019 1:44:08 PM CDT)   Prescription/Discharge Order    fluticasone nasal  :   fluticasone nasal ; Status:   Prescribed ; Ordered As Mnemonic:   Flonase 50 mcg/inh nasal spray ; Simple Display Line:   2 spray(s), Nasal, daily, 16 gm, 2 Refill(s) ; Ordering Provider:   Vernon Gardiner MD; Catalog Code:   fluticasone nasal ; Order Dt/Tm:   10/3/2019 10:29:29 AM CDT          hydroCHLOROthiazide  :   hydroCHLOROthiazide ; Status:   Prescribed ; Ordered As Mnemonic:   hydroCHLOROthiazide 25 mg oral tablet ; Simple Display Line:   25 mg, 1 tab(s), Oral, daily, 90 tab(s), 3 Refill(s) ; Ordering Provider:   July Aparicio MD; Catalog  Code:   hydroCHLOROthiazide ; Order Dt/Tm:   7/19/2019 12:08:14 PM CDT          lansoprazole  :   lansoprazole ; Status:   Prescribed ; Ordered As Mnemonic:   lansoprazole 30 mg oral delayed release capsule ; Simple Display Line:   30 mg, 1 cap(s), Oral, daily, 90 cap(s), 3 Refill(s) ; Ordering Provider:   July Aparicio MD; Catalog Code:   lansoprazole ; Order Dt/Tm:   3/29/2019 5:24:46 PM CDT          Miscellaneous Rx Supply  :   Miscellaneous Rx Supply ; Status:   Prescribed ; Ordered As Mnemonic:   Auto Titrating CPAP 6-16 for daily use ; Simple Display Line:   See Instructions, Heated humidifier x1; Humidifier chamber x1;  Heated tubing x1; Full face mask of choice with headgear  x1; Cushion x 1;\r\nFilters: Disposable x1pk & Reusable x1pk.\r\nLength of Need = 99 Months, 1 EA, 11 Refill(s) ; Ordering Provider:   Vernon Gardiner MD; Catalog Code:   Miscellaneous Rx Supply ; Order Dt/Tm:   8/22/2019 11:00:18 AM CDT

## 2022-02-15 NOTE — LETTER
(Inserted Image. Unable to display)       July 24, 2019      LEATHA PIZARRO  [address] [facility]  [place] [id number]        Dear LEATHA,     Thank you for selecting [facility] for your healthcare needs. Below you will find the results of your recent test(s) done at our clinic.      Your labs are looking better.  I suggest you keep doing the iron supplement until we get your ferritin over 70.  We could check again in a few more months.      Result Name Current Result Previous Result Reference Range   Iron Level (mcg/dL)  72 7/19/2019 ((L)) 14 3/1/2019 45 - 160   TIBC  416 7/19/2019  438 3/1/2019 250 - 450   Iron Saturation  17 7/19/2019 ((L)) 3 3/1/2019 16 - 45   Ferritin (ng/mL)  27 7/19/2019 ((L)) 5 3/1/2019 16 - 288   WBC  6.7 7/19/2019  4.9 4/10/2019 3.8 - 10.8   RBC ((H)) 5.25 7/19/2019 ((H)) 5.47 4/10/2019 3.80 - 5.10   Hgb (gm/dL)  12.8 7/19/2019  12.1 4/10/2019 11.7 - 15.5   Hct (%)  41.1 7/19/2019  40.2 4/10/2019 35.0 - 45.0   MCV (fL) ((L)) 78.3 7/19/2019 ((L)) 73.5 4/10/2019 80.0 - 100.0   MCH (pg) ((L)) 24.4 7/19/2019 ((L)) 22.1 4/10/2019 27.0 - 33.0   MCHC (gm/dL) ((L)) 31.1 7/19/2019 ((L)) 30.1 4/10/2019 32.0 - 36.0   RDW (%) ((H)) 17.2 7/19/2019 ((H)) 25.5 4/10/2019 11.0 - 15.0   Platelet  315 7/19/2019  287 4/10/2019 140 - 400   MPV (fL)  10.8 7/19/2019  11.0 4/10/2019 7.5 - 12.5     Please contact my practice at [phone] if you have any questions or concerns.     Sincerely,        July Aparicio MD      What do your labs mean?  Below is a glossary of commonly ordered labs:  LDL   Bad Cholesterol   HDL   Good Cholesterol  AST/ALT   Liver Function   Cr/Creatinine   Kidney Function  Microalbumin   Kidney Function  BUN   Kidney Function  PSA   Prostate    TSH   Thyroid Hormone  HgbA1c   Diabetes Test   Hgb (Hemoglobin)   Red Blood Cells

## 2022-02-15 NOTE — NURSING NOTE
Depression Screening Entered On:  3/4/2019 8:44 AM CST    Performed On:  3/1/2019 8:44 AM CST by Naila Darby               Depression Screening   Feeling Down, Depressed, Hopeless :   Not at all   Little Interest - Pleasure in Activities :   Several days   Initial Depression Screen Score :   1    Trouble Falling or Staying Asleep :   Several days   Feeling Tired or Little Energy :   Several days   Poor Appetite or Overeating :   Several days   Feeling Bad About Yourself :   Not at all   Trouble Concentrating :   Not at all   Moving or Speaking Slowly :   Not at all   Thoughts Better Off Dead or Hurting Self :   Not at all   Detailed Depression Screen Score :   3    Total Depression Screen Score :   4    ALEX Difficulty with Work, Home, Others :   Not difficult at all   Naila Darby - 3/4/2019 8:44 AM CST

## 2022-02-15 NOTE — LETTER
(Inserted Image. Unable to display)       March 21, 2019Re:  LEATHA AGUIRREINDOB:  1955[address] [place] [id number]   Reyes,The following patient has been referred to your office/practice:  LEATHA PIZARRO  Appointment is scheduled for March 28, 2019 at 8:55 a.m. Please refer to the attached clinical documentation for a summary of LEATHA's care.  Please do not hesitate to contact our office if any additional clinical questions arise. All relevant records and transition of care documents should be mailed or faxed. Your assistance in providing continuity of care is appreciated. Sincerely, [facility] & [address]) [phone]

## 2022-02-15 NOTE — PROGRESS NOTES
Chief Complaint    Pt here for CPAP compliance. Options as her mask makes her nose itchy.  History of Present Illness      63-year-old following up after starting auto titrating CPAP from moderate obstructive sleep apnea.       Says she is delighted it really has made her feel better she has energy again.  She started to cook and she is going to the gym.  Says her  and friends have also noticed a big improvement.  She shows me the cat which she monitors her CPAP and we went through it and it showed an AHI about 1 on average with 100% usage leaking only on a couple of days.  She did change mask and says she is very happy with her present  Review of Systems      See HPI.  All other review of systems negative.  Physical Exam   Vitals & Measurements    HR: 56(Peripheral)  BP: 148/98  SpO2: 97%     HT: 65 in  WT: 233 lb  BMI: 38.77       Alert and oriented normal respiratory effort       Nares has no lesions oropharynx unremarkable  Assessment/Plan       Ordered:         fluticasone nasal, = 2 spray(s), Nasal, daily, # 16 gm, 2 Refill(s), Type: Maintenance, Pharmacy: [id number] [place], 2 spray(s) Nasal daily, (Ordered)      Excellent compliance and good results with her CPAP.  He has had a little minor nasal congestion she will use some Flonase she will continue to use her CPAP regularly and if she has problems she will let me know otherwise we will see her back in 1 year she is aware her blood pressure is high today and she does have plans to follow-up with her regular physician  Patient Information     Name:LEATHA PIZARRO      Address:      [address] [place] [address]     Sex:Female     YOB: 1955     [phone]     Emergency Contact:ALEX PIZARRO     MRN:[id number]     FIN:[id number]     Location:[facility]     Date of Service:10/03/2019      Primary Care Physician:       Markus NGUYEN July, [phone]      Attending Physician:       Abdifatah NGUYEN,  Vernon, [phone]  Problem List/Past Medical History    Ongoing     Anemia     Diverticulitis       Comments: Diverticulitis of descending colon     Diverticulitis of sigmoid colon       Comments: moderate on descending colon and sigmoid seen on colonoscopy 6/19     Hemorrhoid       Comments: internal and external seen on 6/19 colonoscopy     Hiatal hernia     History of colon polyps     Hypertension     Moderate obstructive sleep apnea     Morbid obesity     Schatzki's ring       Comments: distal esophagus, dilated 06/01/2019    Historical     No qualifying data  Procedure/Surgical History     Polysomnogram (08/15/2019)      Comments: AHI: 17.9.     Colonoscopy (06/07/2019)      Comments: Indication:  Iron deviciency anemia.  Hemoccult positive stools.      Sedation:  MAC.      Impression:  One 3 mm sessile polyp in cecum.  Diverticulosis without diverticulitis.  External hemorrhoids.. Recommendation:  Repeat in 5 years,.     Esophagogastroduodenoscopy (06/07/2019)      Comments: Indication:  Iron deficiency anemia, dysphagia..     Adenomatous polyp      Comments: colonoscopy biopsy.     Cholecystectomy     Colonoscopy     Extraction of wisdom tooth     Hysterectomy     Tonsillectomy  Medications    Auto Titrating CPAP 6-16 for daily use, See Instructions, 11 refills    Flonase 50 mcg/inh nasal spray, 2 spray(s), Nasal, daily, 2 refills    hydroCHLOROthiazide 25 mg oral tablet, 25 mg= 1 tab(s), Oral, daily, 3 refills    lansoprazole 30 mg oral delayed release capsule, 30 mg= 1 cap(s), Oral, daily, 3 refills  Allergies    No Known Medication Allergies  Social History    Smoking Status - 07/19/2019     Never smoker     Alcohol      Current, 0-1 TIME PER WEEK, 03/04/2019     Employment/School      Employed, Work/School description: Sales., 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 Exercise type: Curves., 03/07/2019     Home/Environment      Marital status:  ., 03/06/2019     Substance Abuse - Denies Substance Abuse, 03/07/2019     Tobacco - Denies Tobacco Use, 03/06/2019  Family History    Arthritis: Father.    Diabetes mellitus type I: Mother and Sister.    Dyslipidemia: Father.    Heart disease: Sister.    High blood pressure: Father.    MI - Myocardial infarction: Father.    Smoker: Father.  Immunizations      Vaccine Date Status      influenza virus vaccine, inactivated 03/01/2019 Given      pneumococcal (PCV13) 03/01/2019 Given      tetanus/diphth/pertuss (Tdap) adult/adol 03/01/2019 Recorded      meningococcal conjugate vaccine 12/07/2007 Recorded      hepatitis B pediatric vaccine 12/07/2007 Recorded      influenza virus vaccine, inactivated 12/07/2007 Recorded      tetanus/diphth/pertuss (Tdap) adult/adol 12/07/2007 Recorded  Lab Results          Lab Results (Last 4 results within 90 days)           Sodium Level: 140 mmol/L [135 mmol/L - 146 mmol/L] (09/05/19 10:57:00)          Potassium Level: 3.9 mmol/L [3.5 mmol/L - 5.3 mmol/L] (09/05/19 10:57:00)          Chloride Level: 103 mmol/L [98 mmol/L - 110 mmol/L] (09/05/19 10:57:00)          CO2 Level: 30 mmol/L [20 mmol/L - 32 mmol/L] (09/05/19 10:57:00)          Glucose Level: 97 mg/dL [65 mg/dL - 139 mg/dL] (09/05/19 10:57:00)          BUN: 19 mg/dL [7 mg/dL - 25 mg/dL] (09/05/19 10:57:00)          Creatinine Level: 1.04 mg/dL High [0.5 mg/dL - 0.99 mg/dL] (09/05/19 10:57:00)          BUN/Creat Ratio: 18 [6  - 22] (09/05/19 10:57:00)          eGFR: 56 mL/min/1.73m2 Low (09/05/19 10:57:00)          eGFR African American: 65 mL/min/1.73m2 (09/05/19 10:57:00)          Calcium Level: 10 mg/dL [8.6 mg/dL - 10.4 mg/dL] (09/05/19 10:57:00)          Iron Level: 72 mcg/dL [45 mcg/dL - 160 mcg/dL] (07/19/19 12:25:00)          TIBC: 416 [250  - 450] (07/19/19 12:25:00)          Iron Saturation: 17 [16  - 45] (07/19/19 12:25:00)          Ferritin: 27 ng/mL [16 ng/mL - 288 ng/mL] (07/19/19 12:25:00)          WBC: 6.7  [3.8  - 10.8] (07/19/19 12:25:00)          RBC: 5.25 High [3.8  - 5.1] (07/19/19 12:25:00)          Hgb: 12.8 gm/dL [11.7 gm/dL - 15.5 gm/dL] (07/19/19 12:25:00)          Hct: 41.1 % [35 % - 45 %] (07/19/19 12:25:00)          MCV: 78.3 fL Low [80 fL - 100 fL] (07/19/19 12:25:00)          MCH: 24.4 pg Low [27 pg - 33 pg] (07/19/19 12:25:00)          MCHC: 31.1 gm/dL Low [32 gm/dL - 36 gm/dL] (07/19/19 12:25:00)          RDW: 17.2 % High [11 % - 15 %] (07/19/19 12:25:00)          Platelet: 315 [140  - 400] (07/19/19 12:25:00)          MPV: 10.8 fL [7.5 fL - 12.5 fL] (07/19/19 12:25:00)

## 2022-02-15 NOTE — RESULTS
Patient:   LEATHA PIZARRO            MRN: [id number]            FIN: [id number]               Age:   65 years     Sex:  Female     :  1955   Associated Diagnoses:   None   Author:   Markus NGUYEN, July      Procedure   EKG procedure   Date:  2021.     Confirmed: patient.     Indication: dizzy.     Position: supine.     EKG findings   Interpretation: by primary care provider.     Rhythm: heart rate  49  beats/min, sinus bradycardia.     Axis: normal axis, normal configuration.     Within normal limits.     Intervals: KY normal, QRS normal, QT normal.     P waves: normal.     QRS complex: normal.     ST-T-U complex: normal.     Interpretation: artifact, borderline.     Discussed: with patient.        Impression and Plan   Orders

## 2022-02-15 NOTE — TELEPHONE ENCOUNTER
---------------------  From: Mehreen Johns RN   Sent: 11/1/2021 5:33:47 PM CDT  Subject: HCTZ refill     Time of Call:  1623  Return call at:1720     Person Calling:  Susu Adams  Phone number:  [phone]    Note:   Patient needs a refill of HCTZ 25mg  Date of last office visit: _     Prescription for HCTZ  last written on: 4/19/21   Quantity: 90 Refill(s): 1      One month refill sent per protocol. Advised to schedule f/u HTN  Last office visit and reason:  4/2/21

## 2022-02-15 NOTE — NURSING NOTE
Comprehensive Intake Entered On:  4/10/2019 10:45 AM CDT    Performed On:  4/10/2019 10:41 AM CDT by Niki Camargo CMA               Summary   Chief Complaint :   f/u iron deficiency    Weight Measured :   241.6 lb(Converted to: 241 lb 10 oz, 109.59 kg)    Systolic Blood Pressure :   140 mmHg (HI)    Diastolic Blood Pressure :   70 mmHg   Mean Arterial Pressure :   93 mmHg   Peripheral Pulse Rate :   72 bpm   BP Site :   Right arm   BP Method :   Manual   HR Method :   Electronic   Temperature Tympanic :   97.8 DegF(Converted to: 36.6 DegC)  (LOW)    Oxygen Saturation :   98 %   Niki Camargo CMA - 4/10/2019 10:41 AM CDT   Health Status   Allergies Verified? :   Yes   Medication History Verified? :   Yes   Pre-Visit Planning Status :   Completed   Tobacco Use? :   Never smoker   Niki Camargo CMA - 4/10/2019 10:41 AM CDT   Consents   Consent for Immunization Exchange :   Consent Granted   Consent for Immunizations to Providers :   Consent Granted   Niki Camargo CMA - 4/10/2019 10:41 AM CDT   Meds / Allergies   (As Of: 4/10/2019 10:45:01 AM CDT)   Allergies (Active)   No Known Medication Allergies  Estimated Onset Date:   Unspecified ; Created By:   Niki Camargo CMA; Reaction Status:   Active ; Category:   Drug ; Substance:   No Known Medication Allergies ; Type:   Allergy ; Updated By:   Niki Camargo CMA; Reviewed Date:   3/1/2019 1:02 PM CST        Medication List   (As Of: 4/10/2019 10:45:01 AM CDT)   Prescription/Discharge Order    amoxicillin  :   amoxicillin ; Status:   Processing ; Ordered As Mnemonic:   amoxicillin 500 mg oral capsule ; Ordering Provider:   July Aparicio MD; Action Display:   Complete ; Catalog Code:   amoxicillin ; Order Dt/Tm:   4/10/2019 10:41:35 AM          lansoprazole  :   lansoprazole ; Status:   Prescribed ; Ordered As Mnemonic:   lansoprazole 30 mg oral delayed release capsule ; Simple Display Line:   30 mg, 1 cap(s), Oral, daily, 90 cap(s), 3 Refill(s) ; Ordering Provider:    July Aparicio MD; Catalog Code:   lansoprazole ; Order Dt/Tm:   3/29/2019 5:24:46 PM

## 2022-02-15 NOTE — TELEPHONE ENCOUNTER
---------------------From: Tiara Schneider To:  <[email or web address]>;   Sent: 3/4/2019 6:26:01 AM CSTSubject: General Message Patient: LEATHA PIZARRO; YOB: 1955 The attached Referral Note is for an appointment scheduled with Dr. Justice on March 4, 2019 at 1:45 at [facility].

## 2022-02-15 NOTE — NURSING NOTE
Comprehensive Intake Entered On:  10/3/2019 10:21 AM CDT    Performed On:  10/3/2019 10:19 AM CDT by Peyton Aguilera               Summary   Chief Complaint :   Pt here for CPAP compliance. Options as her mask makes her nose itchy.    Weight Measured :   233 lb(Converted to: 233 lb 0 oz, 105.69 kg)    Height Measured :   65 in(Converted to: 5 ft 5 in, 165.10 cm)    Body Mass Index :   38.77 kg/m2 (HI)    Body Surface Area :   2.2 m2   Systolic Blood Pressure :   148 mmHg (HI)    Diastolic Blood Pressure :   98 mmHg (HI)    Mean Arterial Pressure :   115 mmHg   Peripheral Pulse Rate :   56 bpm (LOW)    BP Site :   Right arm   BP Method :   Manual   HR Method :   Manual   Oxygen Saturation :   97 %   Peyton Aguilera - 10/3/2019 10:19 AM CDT   Health Status   Allergies Verified? :   Yes   Medication History Verified? :   Yes   Medical History Verified? :   Yes   Pre-Visit Planning Status :   Completed   Peyton Aguilera - 10/3/2019 10:19 AM CDT   Consents   Consent for Immunization Exchange :   Consent Granted   Consent for Immunizations to Providers :   Consent Granted   Peyton Aguilera - 10/3/2019 10:19 AM CDT   Meds / Allergies   (As Of: 10/3/2019 10:21:03 AM CDT)   Allergies (Active)   No Known Medication Allergies  Estimated Onset Date:   Unspecified ; Created By:   Niki Camargo CMA; Reaction Status:   Active ; Category:   Drug ; Substance:   No Known Medication Allergies ; Type:   Allergy ; Updated By:   Niki Camargo CMA; Reviewed Date:   10/3/2019 10:20 AM CDT        Medication List   (As Of: 10/3/2019 10:21:03 AM CDT)   Prescription/Discharge Order    Miscellaneous Rx Supply  :   Miscellaneous Rx Supply ; Status:   Prescribed ; Ordered As Mnemonic:   Auto Titrating CPAP 6-16 for daily use ; Simple Display Line:   See Instructions, Heated humidifier x1; Humidifier chamber x1;  Heated tubing x1; Full face mask of choice with headgear  x1; Cushion x 1;\r\nFilters:  Disposable x1pk & Reusable x1pk.\r\nLength of Need = 99 Months, 1 EA, 11 Refill(s) ; Ordering Provider:   Vernon Gardiner MD; Catalog Code:   Miscellaneous Rx Supply ; Order Dt/Tm:   8/22/2019 11:00:18 AM          hydroCHLOROthiazide  :   hydroCHLOROthiazide ; Status:   Prescribed ; Ordered As Mnemonic:   hydroCHLOROthiazide 25 mg oral tablet ; Simple Display Line:   25 mg, 1 tab(s), Oral, daily, 90 tab(s), 3 Refill(s) ; Ordering Provider:   July Aparicio MD; Catalog Code:   hydroCHLOROthiazide ; Order Dt/Tm:   7/19/2019 12:08:14 PM          lansoprazole  :   lansoprazole ; Status:   Prescribed ; Ordered As Mnemonic:   lansoprazole 30 mg oral delayed release capsule ; Simple Display Line:   30 mg, 1 cap(s), Oral, daily, 90 cap(s), 3 Refill(s) ; Ordering Provider:   July Aparicio MD; Catalog Code:   lansoprazole ; Order Dt/Tm:   3/29/2019 5:24:46 PM

## 2022-02-15 NOTE — NURSING NOTE
Comprehensive Intake Entered On:  8/22/2019 10:44 AM CDT    Performed On:  8/22/2019 10:39 AM CDT by Medina Olguin CMA               Summary   Chief Complaint :   f/u HST.   Weight Measured :   244 lb(Converted to: 244 lb 0 oz, 110.68 kg)    Systolic Blood Pressure :   156 mmHg (HI)    Diastolic Blood Pressure :   96 mmHg (HI)    Mean Arterial Pressure :   116 mmHg   Peripheral Pulse Rate :   68 bpm   BP Site :   Right arm   Pulse Site :   Radial artery   BP Method :   Manual   HR Method :   Manual   Medina Olguin CMA - 8/22/2019 10:39 AM CDT   Health Status   Allergies Verified? :   Yes   Medication History Verified? :   Yes   Pre-Visit Planning Status :   Completed   Medina Olguin CMA - 8/22/2019 10:39 AM CDT   Consents   Consent for Immunization Exchange :   Consent Granted   Consent for Immunizations to Providers :   Consent Granted   Medina Olguin CMA - 8/22/2019 10:39 AM CDT   Meds / Allergies   (As Of: 8/22/2019 10:44:34 AM CDT)   Allergies (Active)   No Known Medication Allergies  Estimated Onset Date:   Unspecified ; Created By:   Niki Camargo CMA; Reaction Status:   Active ; Category:   Drug ; Substance:   No Known Medication Allergies ; Type:   Allergy ; Updated By:   Niki Camargo CMA; Reviewed Date:   7/19/2019 11:19 AM CDT        Medication List   (As Of: 8/22/2019 10:44:34 AM CDT)   Prescription/Discharge Order    hydroCHLOROthiazide  :   hydroCHLOROthiazide ; Status:   Prescribed ; Ordered As Mnemonic:   hydroCHLOROthiazide 25 mg oral tablet ; Simple Display Line:   25 mg, 1 tab(s), Oral, daily, 90 tab(s), 3 Refill(s) ; Ordering Provider:   July Aparicio MD; Catalog Code:   hydroCHLOROthiazide ; Order Dt/Tm:   7/19/2019 12:08:14 PM          lansoprazole  :   lansoprazole ; Status:   Prescribed ; Ordered As Mnemonic:   lansoprazole 30 mg oral delayed release capsule ; Simple Display Line:   30 mg, 1 cap(s), Oral, daily, 90 cap(s), 3 Refill(s) ; Ordering Provider:   July Aparicio MD;  Catalog Code:   lansoprazole ; Order Dt/Tm:   3/29/2019 5:24:46 PM

## 2022-02-15 NOTE — TELEPHONE ENCOUNTER
---------------------  From: Naila Darby   To: Abel Real MD;     Sent: 8/16/2019 11:30:45 AM CDT  Subject: Referral Management     Patient has a home sleep study uploaded into [employer] ready for interpretation.---------------------  From: Abel Real MD   To: Naila Darby;     Sent: 8/16/2019 2:44:11 PM CDT  Subject: RE: Referral Management     done

## 2022-02-15 NOTE — LETTER
(Inserted Image. Unable to display)       April 19, 2019      LEATHA PIZARRO  [address] [place] [id number]        Dear LEATHA,     Thank you for selecting [facility] for your healthcare needs. Below you will find the results of your recent test(s) done at our clinic.      Your anemia is improving.  Please continue to follow up with GI to find out if there is a problem in your GI tract that caused the anemia.  Also, please continue with the iron supplement for another 3 months.  We should then repeat your iron studies to see if you have replenished your body's iron stores.      Result Name Current Result Previous Result Reference Range   WBC  4.9 4/10/2019  5.7 3/1/2019 3.8 - 10.8   RBC ((H)) 5.47 4/10/2019  4.59 3/1/2019 3.80 - 5.10   Hgb (gm/dL)  12.1 4/10/2019 ((L)) 8.9 3/1/2019 11.7 - 15.5   Hct (%)  40.2 4/10/2019 ((L)) 30.9 3/1/2019 35.0 - 45.0   MCV (fL) ((L)) 73.5 4/10/2019 ((L)) 67.3 3/1/2019 80.0 - 100.0   MCH (pg) ((L)) 22.1 4/10/2019 ((L)) 19.4 3/1/2019 27.0 - 33.0   MCHC (gm/dL) ((L)) 30.1 4/10/2019 ((L)) 28.8 3/1/2019 32.0 - 36.0   RDW (%) ((H)) 25.5 4/10/2019 ((H)) 15.9 3/1/2019 11.0 - 15.0   Platelet  287 4/10/2019  325 3/1/2019 140 - 400   MPV (fL)  11.0 4/10/2019  9.9 3/1/2019 7.5 - 12.5     Please contact my practice at [phone] if you have any questions or concerns.     Sincerely,        July Aparicio MD      What do your labs mean?  Below is a glossary of commonly ordered labs:  LDL   Bad Cholesterol   HDL   Good Cholesterol  AST/ALT   Liver Function   Cr/Creatinine   Kidney Function  Microalbumin   Kidney Function  BUN   Kidney Function  PSA   Prostate    TSH   Thyroid Hormone  HgbA1c   Diabetes Test   Hgb (Hemoglobin)   Red Blood Cells

## 2022-02-15 NOTE — NURSING NOTE
Comprehensive Intake Entered On:  4/2/2021 10:03 AM CDT    Performed On:  4/2/2021 9:41 AM CDT by Michelle Moore               Summary   Chief Complaint :   Pt c/o dizzy, nauseous, off balance, denies falling. x 2 days. Denies fever or vomiting.    Ht/Wt Measurement Refused by Patient? :   Yes   Systolic Blood Pressure :   180 mmHg (HI)    Diastolic Blood Pressure :   100 mmHg (HI)    Mean Arterial Pressure :   127 mmHg   Peripheral Pulse Rate :   65 bpm   BP Site :   Right arm   BP Method :   Manual   Respiratory Rate :   16 br/min   Oxygen Saturation :   98 %   Michelle Moore - 4/2/2021 9:41 AM CDT   Health Status   Allergies Verified? :   Yes   Medication History Verified? :   Yes   Tobacco Use? :   Never smoker   Michelle Moore - 4/2/2021 9:41 AM CDT   Consents   Consent for Immunization Exchange :   Consent Granted   Consent for Immunizations to Providers :   Consent Granted   Michelle Moore - 4/2/2021 9:41 AM CDT   Meds / Allergies   (As Of: 4/2/2021 10:03:30 AM CDT)   Allergies (Active)   No Known Medication Allergies  Estimated Onset Date:   Unspecified ; Created By:   Niki Camargo CMA; Reaction Status:   Active ; Category:   Drug ; Substance:   No Known Medication Allergies ; Type:   Allergy ; Updated By:   Niki Camargo CMA; Reviewed Date:   4/2/2021 9:46 AM CDT        Medication List   (As Of: 4/2/2021 10:03:30 AM CDT)   Prescription/Discharge Order    hydroCHLOROthiazide  :   hydroCHLOROthiazide ; Status:   Prescribed ; Ordered As Mnemonic:   hydroCHLOROthiazide 25 mg oral tablet ; Simple Display Line:   1 tab(s), Oral, daily, 30 tab(s), 0 Refill(s) ; Ordering Provider:   July Aparicio MD; Catalog Code:   hydroCHLOROthiazide ; Order Dt/Tm:   3/22/2021 1:37:39 PM CDT          losartan  :   losartan ; Status:   Prescribed ; Ordered As Mnemonic:   losartan 25 mg oral tablet ; Simple Display Line:   25 mg, 1 tab(s), Oral, daily, 90 tab(s), 0 Refill(s) ; Ordering Provider:   July Aparicio MD;  Catalog Code:   losartan ; Order Dt/Tm:   10/4/2019 2:10:39 PM CDT          fluticasone nasal  :   fluticasone nasal ; Status:   Prescribed ; Ordered As Mnemonic:   Flonase 50 mcg/inh nasal spray ; Simple Display Line:   2 spray(s), Nasal, daily, 16 gm, 2 Refill(s) ; Ordering Provider:   Vernon Gardiner MD; Catalog Code:   fluticasone nasal ; Order Dt/Tm:   10/3/2019 10:29:29 AM CDT          Miscellaneous Rx Supply  :   Miscellaneous Rx Supply ; Status:   Prescribed ; Ordered As Mnemonic:   Auto Titrating CPAP 6-16 for daily use ; Simple Display Line:   See Instructions, Heated humidifier x1; Humidifier chamber x1;  Heated tubing x1; Full face mask of choice with headgear  x1; Cushion x 1;\r\nFilters: Disposable x1pk & Reusable x1pk.\r\nLength of Need = 99 Months, 1 EA, 11 Refill(s) ; Ordering Provider:   Vernon aGrdiner MD; Catalog Code:   Miscellaneous Rx Supply ; Order Dt/Tm:   8/22/2019 11:00:18 AM CDT          lansoprazole  :   lansoprazole ; Status:   Prescribed ; Ordered As Mnemonic:   lansoprazole 30 mg oral delayed release capsule ; Simple Display Line:   30 mg, 1 cap(s), Oral, daily, 90 cap(s), 3 Refill(s) ; Ordering Provider:   Braden Aparicio MDica; Catalog Code:   lansoprazole ; Order Dt/Tm:   3/29/2019 5:24:46 PM CDT            ID Risk Screen   Recent Travel History :   No recent travel   Family Member Travel History :   No recent travel   Other Exposure to Infectious Disease :   Unknown   COVID-19 Testing Status :   No COVID-19 test performed   Michelle Moore 4/2/2021 9:41 AM CDT   Social History   Social History   (As Of: 4/2/2021 10:03:30 AM CDT)   Alcohol:        Current, 0-1 TIME PER WEEK   (Last Updated: 3/4/2019 8:43:55 AM CST by Naila Darby)          Tobacco:  Denies Tobacco Use      Never (less than 100 in lifetime)   (Last Updated: 4/2/2021 9:42:05 AM CDT by Michelle Moore)          Electronic Cigarette/Vaping:        Electronic Cigarette Use: Never.   (Last Updated: 4/2/2021  9:42:12 AM CDT by Michelle Moore)          Substance Abuse:  Denies Substance Abuse      (Last Updated: 3/7/2019 11:20:15 AM CST by Herlinda Frank )         Employment/School:        Employed, Work/School description: Sales.   (Last Updated: 3/7/2019 1:25:35 PM CST by Herlinda Frank)          Home/Environment:        Marital status: .   (Last Updated: 3/6/2019 3:48:22 PM CST by Herlinda Frank)          Exercise:  Regular exercise      Exercise frequency: 3-4 times/week.  Exercise type: Curves.   (Last Updated: 3/7/2019 11:30:33 AM CST by Herlinda Frank)            More Vitals   Systolic Blood Pressure Supine :   171 mmHg   Diastolic Blood Pressure Supine :   102 mmHg   Systolic Blood Pressure Sitting :   176 mmHg   Diastolic Blood Pressure Sitting :   111 mmHg   Systolic Blood Pressure Standing :   168 mmHg   Diastolic Blood Pressure Standing :   108 mmHg   Pulse Supine :   57 bpm   Pulse Sitting :   58 bpm   Pulse Standing :   64 bpm   Michelle Moore - 4/2/2021 9:41 AM CDT

## 2022-02-15 NOTE — NURSING NOTE
Vitals/Measurements Entered On:  8/5/2020 9:18 AM CDT    Performed On:  8/5/2020 9:18 AM CDT by Monalisa Beltran CMA               Vital Signs   Systolic Blood Pressure :   161 mmHg (HI)    Diastolic Blood Pressure :   89 mmHg (HI)    Mean Arterial Pressure :   113 mmHg   BP Site :   Left arm   Peripheral Pulse Rate :   55 bpm (LOW)    Monalisa Beltran CMA - 8/5/2020 9:18 AM CDT

## 2022-02-15 NOTE — PROGRESS NOTES
Patient:   LEATHA PIZARRO            MRN: [id number]            FIN: [id number]               Age:   65 years     Sex:  Female     :  1955   Associated Diagnoses:   Cough with exposure to COVID-19 virus   Author:   Brian Salomon PA-C      Visit Information      Date of Service: 10/28/2021 06:31 am  Performing Location: [facility]  Encounter#: [id number]      Primary Care Provider (PCP):  July Aparicio MD    NPI# [id number]      Referring Provider:  Brian Salomon PA-C    NPI# [id number]   Visit type:  Telephone Encounter.    Source of history:  Patient.    Location of patient:  Home   Call Start Time:   800   Call End Time:    805      Chief Complaint   Sore throat.      History of Present Illness   Today's visit was conducted via telephone due to the COVID-19 pandemic. Patient's consent to telephone visit was obtained and documented.      Reason for visit:  Sore throat, nasal congestion. Exposure to positive case at work. Has been vaccinated      Review of Systems   Ear/Nose/Mouth/Throat:  Negative except as documented in history of present illness.    Respiratory:  Negative.    Neurologic:  Negative except as documented in history of present illness.       Impression and Plan   Diagnosis     Cough with exposure to COVID-19 virus (WBS53-FD R05.8).     Patient Instructions:       Counseled: Patient.    Summary:  Patient should remain isolated until results of test return and given that tests are not 100% accurate, would be safest to assume that they are contagious with COVID-19 until their symptoms have fully resolved. Isolation is recommended for at least 7 days from the onset of symptoms and for 3 days after resolution of fevers and productive cough. This means patient should not go to work or any public areas. In addition, it is recommended at home that they separate themselves from other people and from animals as much as possible, including using a separate bathroom. If they do  need to be around others, a facemask is recommended. Frequent hand hygiene and cleaning of high touch surfaces is also recommended.   Symptoms can last for several weeks. For patients with COVID-19, they can sometimes start to improve and then get worse again. If symptoms worsen at any time, including significant shortness of breath, low oxygen levels, high fevers that cannot be controlled, or concerns for dehydration, they should seek medical care. If going to the ER, calling 911, or seeking care at the clinic, they are reminded to notify staff that they have been tested for COVID-19.  Patient also is informed that testing will be done in their car at a scheduled time. Test will be sent to an outside commercial lab and billed by that lab. [employer] cannot confirm to patient how billing will be handled by their insurance company.    Patient is also informed that testing for COVID-19 must be reported to the public health department along with contact information for the patient.  .    Orders     Orders (Selected)   Outpatient Orders  Ordered  SARS-CoV-2 RNA (COVID-19), Qualitative NAAT (Request): Cough with exposure to COVID-19 virus.        Health Status   Allergies:    Allergic Reactions (Selected)  No Known Medication Allergies   Medications:  (Selected)   Prescriptions  Prescribed  Auto Titrating CPAP 6-16 for daily use: Auto Titrating CPAP 6-16 for daily use, See Instructions, Instructions: Heated humidifier x1; Humidifier chamber x1;  Heated tubing x1; Full face mask of choice with headgear  x1; Cushion x 1;\r\nFilters: Disposable x1pk & Reusable x1pk.\r\nLength of...  Flonase 50 mcg/inh nasal spray: = 2 spray(s), Nasal, daily, # 16 gm, 2 Refill(s), Type: Maintenance, Pharmacy: [id number], 2 spray(s) Nasal daily  hydroCHLOROthiazide 25 mg oral tablet: = 1 tab(s), Oral, daily, # 90 tab(s), 1 Refill(s), Type: Maintenance, Pharmacy: [id number], 1 tab(s) Oral daily,x90 day(s), 63, in,  10/03/19 10:19:00 CDT, Height Measured, 237.8, lb, 10/04/19 13:40:00 CDT, Weight Measured  lansoprazole 30 mg oral delayed release capsule: = 1 cap(s) ( 30 mg ), Oral, daily, # 90 cap(s), 3 Refill(s), Type: Maintenance, Pharmacy: [id number], 1 cap(s) Oral daily  losartan 25 mg oral tablet: = 1 tab(s) ( 25 mg ), Oral, daily, # 90 tab(s), 0 Refill(s), Type: Maintenance, Pharmacy: [facility] [id number] [facility], 1 tab(s) Oral daily  meclizine 25 mg oral tablet: = 1 tab(s) ( 25 mg ), Oral, tid, PRN: for dizziness, # 30 tab(s), 1 Refill(s), Type: Maintenance, Pharmacy: [facility] [id number] [facility], 1 tab(s) Oral tid,PRN:for dizziness, 63, in, 10/03/19 10:19:00 CDT, Height Measured, 237.8, lb, 10/04/19 13:40:00 CDT, [place]...  ondansetron 4 mg oral tablet, disintegrating: = 1 tab(s) ( 4 mg ), Oral, q8 hrs, PRN: nausea, # 30 tab(s), 1 Refill(s), Type: Maintenance, Pharmacy: [facility] [id number] [facility], 1 tab(s) Oral q8 hrs,PRN:nausea, 63, in, 10/03/19 10:19:00 CDT, Height Measured, 237.8, lb, 10/04/19 13:40:00 CDT, Weight Measured...   Problem list:    All Problems  Schatzki's ring / SNOMED CT 105300349 / Confirmed  Morbid obesity / SNOMED CT 525411952 / Probable  Moderate obstructive sleep apnea / SNOMED CT 946714961 / Confirmed  Hypertension / SNOMED CT 3644384524 / Confirmed  History of colon polyps / SNOMED CT 0055043895 / Confirmed  Hiatal hernia / SNOMED CT 906094962 / Confirmed  Hemorrhoid / SNOMED CT 421182708 / Confirmed  Diverticulitis of sigmoid colon / SNOMED CT 1560215814 / Confirmed  Diverticulitis / SNOMED CT 467523769 / Confirmed  Anemia / SNOMED CT 557675330 / Confirmed      Histories   Past Medical History:    No active or resolved past medical history items have been selected or recorded.   Family History:    Dyslipidemia  Father  Heart disease  Sister ()  Diabetes mellitus type I  Sister ()  Mother ()  High blood pressure  Father  Arthritis  Father  Smoker  Father  MI - Myocardial infarction  Father      Procedure history:    Polysomnogram (902954638) on 8/15/2019 at 63 Years.  Comments:  8/20/2019 3:05 PM CDT - Jennifer Koehler CMA  AHI: 17.9  Colonoscopy (803359137) on 6/7/2019 at 63 Years.  Comments:  7/2/2019 11:39 AM CDT Dahiana Kaur  Recommendation:  Repeat in 5 years,    7/2/2019 11:36 AM Dahiana Fleming  Indication:  Iron deviciency anemia.  Hemoccult positive stools.  Sedation:  MAC.  Impression:  One 3 mm sessile polyp in cecum.  Diverticulosis without diverticulitis.  External hemorrhoids.  Esophagogastroduodenoscopy (250789139) on 6/7/2019 at 63 Years.  Comments:  7/2/2019 11:38 AM Dahiana Fleming  Indication:  Iron deficiency anemia, dysphagia.  Cholecystectomy (81951505).  Hysterectomy (362003059).  Colonoscopy (998929150).  Tonsillectomy (729799107).  Extraction of wisdom tooth (002433284).  Adenomatous polyp (534696344).  Comments:  7/25/2019 10:12 AM Niki Felix CMA  colonoscopy biopsy   Social History:        Electronic Cigarette/Vaping Assessment            Electronic Cigarette Use: Never.      Alcohol Assessment            Current, 0-1 TIME PER WEEK      Tobacco Assessment: Denies Tobacco Use            Never (less than 100 in lifetime)      Substance Abuse Assessment: Denies Substance Abuse      Employment and Education Assessment            Employed, Work/School description: Sales.      Home and Environment Assessment            Marital status: .      Exercise and Physical Activity Assessment: Regular exercise            Exercise frequency: 3-4 times/week.  Exercise type: Curves.        Health Maintenance      Recommendations     Pending (in the next year)        OverDue           Alcohol Misuse Screen due  03/01/20  and every 1  year(s)           Depression Screen due  03/01/20  and every 1  year(s)           Lipid Disorders Screen due  03/01/20  and every 1  year(s)           Type 2 Diabetes Mellitus Screen due  03/01/20  and every 1  year(s)           Body Mass  Index Check due  10/03/20  and every 1  year(s)           Influenza Vaccine due  09/01/21  and every 1  year(s)        Due            Breast Cancer Screen due  10/28/21  and every 2  year(s)           Fall Risk Screen due  10/28/21  and every 1  year(s)           HIV Screen (if sexually active) due  10/28/21  and every 1  year(s)           Hepatitis C Screen 0644-8147 due  10/28/21  One-time only           Lung Cancer Screen due  10/28/21  and every 1  year(s)           Osteoporosis Screen due  10/28/21  and every 2  year(s)           STD Counseling (if sexually active) due  10/28/21  and every 1  year(s)           Syphilis Screen (if sexually active) due  10/28/21  and every 1  year(s)        Due In Future            High Blood Pressure Screen not due until  04/02/22  and every 1  year(s)     Satisfied (in the past 1 year)        Satisfied            High Blood Pressure Screen on  04/02/21.           High Blood Pressure Screen on  04/02/21.           High Blood Pressure Screen on  04/02/21.           High Blood Pressure Screen on  04/02/21.           High Blood Pressure Screen on  04/02/21.           Tobacco Use Screen on  10/28/21.           Tobacco Use Screen on  04/02/21.

## 2022-02-15 NOTE — TELEPHONE ENCOUNTER
---------------------  From: July Aparicio MD   To: [employer] Message Pool ([id number]_[place] [facility] [place]);     Sent: 7/19/2019 12:05:47 PM CDT  Subject: General Message     please get colonoscopy path report [facility] Call [phone]. Choose either opt 2 for HI or opt 8 (ext: Vijaya Rodriguez).Spoke to Renee at [facility], she is faxing path report now.---------------------  From: Niki Camargo CMA ([employer] Message Pool ([id number]_[facility]))   To: July Aparicio MD;     Sent: 7/24/2019 11:38:22 AM CDT  Subject: RE: General Message     patients report is in your box.---------------------  From: July Aparicio MD   To: [employer] Message Pool ([id number]_[place] [facility] [place]);     Sent: 7/24/2019 4:58:02 PM CDT  Subject: RE: General Message     thanks, please update her chart

## 2022-02-15 NOTE — LETTER
(Inserted Image. Unable to display)     March 18, 2019      LEATHA PIZARRO  [address] [place] [address] [id number]          Dear LEATHA,      Thank you for selecting [facility] ([facility] for your healthcare needs.      Our records indicate you are due for the following services:     Follow-up office visit with Dr. Dick Prescott.      To schedule an appointment or if you have further questions, please contact your primary clinic:   [facility]       [phone]   [facility]       [phone]              [facility]     [phone]      Powered by [employer]    Sincerely,    July Aparicio MD

## 2022-02-15 NOTE — TELEPHONE ENCOUNTER
---------------------  From: Monalisa Paz (Phone Messages Pool ([id number]_[facility]))   To: [facility] Message Pool ([id number]_[place] [facility] [place]);     Sent: 3/28/2019 4:44:48 PM CDT  Subject: Med change and iron labs.     Phone Message    PCP:   MARIBELL    Time of Call:  415pm       Person Calling:  pt  Phone number:  [phone]  Returned call at: 440pm  Last office visit and reason:  _    Note:    1.Pt calling stating Dexilant 60 mg oral cap will cost her $280 per month. Pt states [facility] told her to call if the cost is to high and she can send in something else.    2. Pt would like to see when she should have her Iron level's rechecked. Level's had been low and did see Gastroenterology. He asked the [facility] order f/u labs.     Called and spoke with pt, she is aware the [facility] OC today and will return her call tomorrow.---------------------  From: Niki Camargo CMA ([facility] Message Pool ([id number]_[facility]))   To: July Aparicio MD;     Sent: 3/29/2019 7:41:19 AM CDT  Subject: FW: Med change and iron labs.Patient called @ 0939 stating she has not heard back. # [phone] vm ok

## 2022-02-15 NOTE — TELEPHONE ENCOUNTER
---------------------  From: July Aparicio MD   To: [facility] Message Pool ([id number]);     Sent: 4/2/2021 1:00:25 PM CDT  Subject: General Message     pls let pt know CBC is normal, still waiting on BMPPt notified and states understanding.

## 2022-02-15 NOTE — PROGRESS NOTES
Chief Complaint    establish care. Shoulder pain left, hip pain right side, right knee pain,  Allergy issues. Thyroid check, weight loss.  History of Present Illness      left shoulder pain with inability to raise shoulder, hx of thyroid disease and is not currently on any medication, heartburn is worsening and hx of sliding hiatal hernia now with a cough, some shortness of breath,  Review of Systems      no fevers, chills, sore throat, runny nose, nausea, vomiting, constipation, rash or new skin lesions, chest pain, palpitations, slurred speech, new paresthesia, shortness of breath or wheeze.  Physical Exam   Vitals & Measurements    T: 98.5   F (Tympanic)  HR: 57(Peripheral)  BP: 128/80  SpO2: 99%     HT: 65 in  WT: 244.0 lb  BMI: 40.6       R      General: alert and oriented ×3 no acute distress.      HEENT: Normocephalic and atraumatic.       Eyes pupils are equal round and reactive to light extraocular motion is intact. normal conjunctiva      Hearing is grossly normal and there is no otorrhea. Tympanic membranes are pearly grey with a normal light reflex.      Nares are patent there is no rhinorrhea.       Mucous membranes are moist and pink.      Chest: has bilateral rise with no increased work of breathing. clear to auscultation without wheezes, rhonchi, or rales.      Cardiovascular: normal perfusion and brisk capillary refill. S1S2 with regular rate and rhythm and no murmurs, gallops or rubs.      Musculoskeletal: no gross focal abnormalities and normal gait.  + carlson sign left upper extremity, unable to lift shoulder past 90 degrees with passive or active motion      Neuro: no gross focal abnormalities and memory seems intact.  CN 2-12 are grossly intact.      Psychiatric: speech is clear and coherent and fluent. Patient dressed appropriately for the weather. Mood is appropriate and affect is full.                           Discussed with patient to return to clinic if symptoms worsen or do not  improve.  Assessment/Plan       Chronic GERD (K21.9)         Ordered:          omeprazole, = 1 cap(s) ( 40 mg ), PO, Daily, # 90 cap(s), 3 Refill(s), Type: Maintenance, [id number], 1 cap(s) Oral daily, (Ordered)                Fatigue (R53.83)         Ordered:          45181 office outpatient new 45 minutes (Charge), Quantity: 1, Shortness of breath  Fatigue  Morbid obesity  Hypersomnolence  Shoulder pain  Screening, lipid  Screening for diabetes mellitus          CBC (h/h, RBC, indices, WBC, Plt)* ([facility]), Specimen Type: Blood, Collection Date: 03/01/19 13:49:00 CST          Comprehensive Metabolic Panel* ([facility]), Specimen Type: Serum, Collection Date: 03/01/19 13:49:00 CST          TSH, 3rd Generation with Reflex to Free T4* ([facility]), Specimen Type: Serum, Collection Date: 03/01/19 13:49:00 CST                Frozen shoulder (M75.00)         Ordered:          Referral (Request), 03/01/19 14:10:00 CST, Referred to: Orthopaedics, Referred to: Dr. Justice, Shoulder pain  Frozen shoulder                History of colon polyps (Z86.010)                Hypersomnolence (G47.10)          sleep study ordered         Ordered:          88737 office outpatient new 45 minutes (Charge), Quantity: 1, Shortness of breath  Fatigue  Morbid obesity  Hypersomnolence  Shoulder pain  Screening, lipid  Screening for diabetes mellitus                Immunization due (Z23)         Ordered:          influenza virus vaccine, inactivated, 0.5 mL, IM, once, (Completed)          pneumococcal 13-valent conjugate vaccine, 0.5 mL, im, once, (Completed)          77896 imadm prq id subq/im njxs 1 vaccine (Charge), Quantity: 1, Immunization due          12854 imadm prq id subq/im njxs 1 vaccine (Charge), Quantity: 1, Immunization due          44031 influenza vaccine splt prsrv free inc antigen im (Charge), Quantity: 1, Immunization due          87941 pneumococcal conj vaccine 13 valent im (Charge), Quantity: 1,  Immunization due                Morbid obesity (E66.01)         Ordered:          12456 office outpatient new 45 minutes (Charge), Quantity: 1, Shortness of breath  Fatigue  Morbid obesity  Hypersomnolence  Shoulder pain  Screening, lipid  Screening for diabetes mellitus                Screen for colon cancer (Z12.11)                Screening for diabetes mellitus (Z13.1)         Ordered:          80565 office outpatient new 45 minutes (Charge), Quantity: 1, Shortness of breath  Fatigue  Morbid obesity  Hypersomnolence  Shoulder pain  Screening, lipid  Screening for diabetes mellitus          Hemoglobin A1c* ([facility]), Specimen Type: Blood, Collection Date: 03/01/19 13:49:00 CST                Screening, lipid (Z13.220), Screening, lipid (Z13.220)         Ordered:          96829 office outpatient new 45 minutes (Charge), Quantity: 1, Shortness of breath  Fatigue  Morbid obesity  Hypersomnolence  Shoulder pain  Screening, lipid  Screening for diabetes mellitus          Direct LDL* ([facility]), Specimen Type: Serum, Collection Date: 03/01/19 14:15:00 CST                Shortness of breath (R06.02)         Ordered:          58191 office outpatient new 45 minutes (Charge), Quantity: 1, Shortness of breath  Fatigue  Morbid obesity  Hypersomnolence  Shoulder pain  Screening, lipid  Screening for diabetes mellitus                Shoulder pain (M25.519)         Ordered:          56877 office outpatient new 45 minutes (Charge), Quantity: 1, Shortness of breath  Fatigue  Morbid obesity  Hypersomnolence  Shoulder pain  Screening, lipid  Screening for diabetes mellitus          Occupational Therapy Evaluation and Treatment (Request), Shoulder pain          Referral (Request), 03/01/19 14:10:00 CST, Referred to: Orthopaedics, Referred to: Dr. Justice, Shoulder pain  Frozen shoulder          XR Shoulder Complete Left (Request), Shoulder pain                Orders:         Return to Clinic (Request),  RFV: follow up shortness of breath, Return in 4 weeks         Return to Clinic (Request), RFV: needs RTC appt with Dr. Prescott to discuss GERD, might need EGD, and needs colonoscopy for screening      45 minutes spent with patient in direct face to face contact, > 50% of time spent counseling and coordinating care.   Patient Information     Name:LEATHA PIZARRO      Address:      [address] [place] [id number]     Sex:Female     YOB: 1955     [phone]     Emergency Contact:ALEX PIZARRO     MRN:[id number]     FIN:[id number]     Location:[facility]     Date of Service:03/01/2019      Primary Care Physician:       July Aparicio MD, [phone]      Attending Physician:       July Aparicio MD, [phone]  Problem List/Past Medical History   colon polyps, obesity, hypothyroid,  Procedure/Surgical History   cd, hysterectomy for fibroids, wisdom teeth,  Medications     omeprazole 40 mg oral delayed release capsule: 40 mg, 1 cap(s), PO, Daily, 90 cap(s), 3 Refill(s).          Allergies    No Known Medication Allergies  Social History    Smoking Status - 03/01/2019     Never smoker  Immunizations      Vaccine Date Status      influenza virus vaccine, inactivated 03/01/2019 Given      pneumococcal (PCV13) 03/01/2019 Given

## 2022-02-15 NOTE — TELEPHONE ENCOUNTER
---------------------  From: Cinthia Ledesma CMA   To: Appointment Pool ([id number]_[place] - [place]);     Sent: 8/26/2020 11:54:46 AM CDT  Subject: Due for HTN check     Can you please call Shannon and let her know that she needs a f/u with MJP to discuss her BP? Her BP was high when last checked but she is overdue for her routine check up as well for BP.CALLED PATIENT LEFT MESSAGE TO SCHEDULELVM X2 to schedule

## 2022-02-15 NOTE — PROGRESS NOTES
Chief Complaint    Pt c/o dizzy, nauseous, off balance, denies falling. x 2 days. Denies fever or vomiting.  History of Present Illness       last night developed sudden onset of dizziness, here with , has a headache, feels ok when she is still, no weakness, cbc and BMP in clinic look nml, see separate EKG note, + nausea, had covid vaccine 3 weeks ago, needs second dose had to  miss work yesterday and today       Here today with her  who is doing the driving for them.       While in clinic some labs were obtained.  They show a basic metabolic panel that looks good glucose is a little high at 112.  CBC shows no anemia.  White blood cell count and differential are normal.       Patient was having quite a bit of nausea with her dizziness so was given ondansetron in clinic and then after this was allowed to have a chance to work we did [place]-Hallpike maneuvers which did result in patient having horizontal nystagmus.       Her tympanic membranes were scarred so difficult to tell if there was some fluid present.  Tympanograms were done and were normal making likelihood of serous otitis media less likely.       After she had a chance to sit and relax a little bit her blood pressure became much improved       EKG today looks very reassuring.   Review of Systems       No chest pain positive dizziness no unilateral weakness no confusion she has had a headache  Physical Exam   Vitals & Measurements    HR: 67 (Peripheral)  RR: 16  BP: 141/94  BP: 176/111(Sitting)  BP: 168/108(Standing)  BP: 171/102(Supine)  SpO2: 98%        General alert and oriented x3  mild distress distress when trying to turn her head from side to side to become more dizzy.  This increases her nausea.  Ondansetron improved her nausea significantly.       Skin is warm and dry there is no diaphoresis       Lungs are clear to auscultation no wheezing rhonchi rales       Cardiovascular regular rate and rhythm no murmurs gallops rubs       Abdomen  soft obese nontender       Neuro cranial nerves II through XII are grossly intact she has normal bilateral upper and lower extremity right with no gross neuro deficit no facial lag no slurred speech.       Trey-Hallpike maneuvers revealed vertical nystagmus repeat.  Assessment/Plan       Vertigo (R42)         Unsure if this is benign positional vertigo versus mood Ménière's disease.  Does seem to be very much influenced by movement of her neck.  1 g.  For meclizine as well as a prescription for ondansetron and suggested that he see physical therapy to help with treatment of her vertigo.  If her symptoms worsen or do not improve would like her to return to clinic clinic.         Ordered:          meclizine, = 1 tab(s) ( 25 mg ), Oral, tid, PRN: for dizziness, # 30 tab(s), 1 Refill(s), Type: Maintenance, Pharmacy: [facility], 1 tab(s) Oral tid,PRN:for dizziness, 65, in, 10/03/19 10:19:00 CDT, Height Measured, 237.8, lb, 10/04/19 13:40:00 CDT, [place]..., (Ordered)          85212 ecg routine ecg w/least 12 lds w/i+r (Charge), Quantity: 1, Vertigo          Basic Metabolic Panel* ([facility]), Specimen Type: Serum, Collection Date: 04/02/21 11:42:00 CDT          CBC w/ Diff/Plt (Request), Priority: STAT, Vertigo          Physical Therapy Evaluation and Treatment (Request), Vertigo                Orders:         ondansetron, = 1 tab(s) ( 4 mg ), Oral, q8 hrs, PRN: nausea, # 30 tab(s), 1 Refill(s), Type: Maintenance, Pharmacy: [facility], 1 tab(s) Oral q8 hrs,PRN:nausea, 65, in, 10/03/19 10:19:00 CDT, Height Measured, 237.8, lb, 10/04/19 13:40:00 CDT, Weight Measured, (Ordered)      Total time spent reviewing chart and preparing for appointment, with patient for appointment, and time spent charting and coordinating care on the day of the appointment in minutes was:47  Patient Information     Name:LILLY PIZARROWILEY SALAZAR      Address:      [address] [place] [id number]     Sex:Female     Date of  Birth:1955     [phone]     Emergency Contact:ALEX PIZARRO     MRN:[id number]     FIN:[id number]     Location:[facility]     Date of Service:04/02/2021      Primary Care Physician:       July Aparicio MD, [phone]      Attending Physician:       July Aparicio MD, [phone]  Problem List/Past Medical History    Ongoing     Anemia     Diverticulitis       Comments: Diverticulitis of descending colon     Diverticulitis of sigmoid colon       Comments: moderate on descending colon and sigmoid seen on colonoscopy 6/19     Hemorrhoid       Comments: internal and external seen on 6/19 colonoscopy     Hiatal hernia     History of colon polyps     Hypertension     Moderate obstructive sleep apnea     Morbid obesity     Schatzki's ring       Comments: distal esophagus, dilated 06/01/2019    Historical     No qualifying data  Procedure/Surgical History     Polysomnogram (08/15/2019)      Comments: AHI: 17.9.     Colonoscopy (06/07/2019)      Comments: Indication:  Iron deviciency anemia.  Hemoccult positive stools.      Sedation:  MAC.      Impression:  One 3 mm sessile polyp in cecum.  Diverticulosis without diverticulitis.  External hemorrhoids.. Recommendation:  Repeat in 5 years,.     Esophagogastroduodenoscopy (06/07/2019)      Comments: Indication:  Iron deficiency anemia, dysphagia..     Adenomatous polyp      Comments: colonoscopy biopsy.     Cholecystectomy     Colonoscopy     Extraction of wisdom tooth     Hysterectomy     Tonsillectomy  Medications    Auto Titrating CPAP 6-16 for daily use, See Instructions, 11 refills    Flonase 50 mcg/inh nasal spray, 2 spray(s), Nasal, daily, 2 refills    hydroCHLOROthiazide 25 mg oral tablet, 1 tab(s), Oral, daily    lansoprazole 30 mg oral delayed release capsule, 30 mg= 1 cap(s), Oral, daily, 3 refills    losartan 25 mg oral tablet, 25 mg= 1 tab(s), Oral, daily    meclizine 25 mg oral tablet, 25 mg= 1 tab(s), Oral, tid, PRN, 1 refills     ondansetron 4 mg oral tablet, disintegrating, 4 mg= 1 tab(s), Oral, q8 hrs, PRN, 1 refills  Allergies    No Known Medication Allergies  Social History    Smoking Status     Never smoker     Alcohol      Current, 0-1 TIME PER WEEK     Electronic Cigarette/Vaping      Electronic Cigarette Use: Never.     Employment/School      Employed, Work/School description: Sales.     Exercise - Regular exercise      Exercise frequency: 3-4 times/week. Exercise type: Curves.     Home/Environment      Marital status: .     Substance Abuse - Denies Substance Abuse     Tobacco - Denies Tobacco Use      Never (less than 100 in lifetime)  Family History    Arthritis: Father.    Diabetes mellitus type I: Mother and Sister.    Dyslipidemia: Father.    Heart disease: Sister.    High blood pressure: Father.    MI - Myocardial infarction: Father.    Smoker: Father.  Immunizations      Vaccine Date Status          influenza virus vaccine, inactivated 03/01/2019 Given          pneumococcal (PCV13) 03/01/2019 Given          tetanus/diphth/pertuss (Tdap) adult/adol 03/01/2019 Recorded          tetanus/diphth/pertuss (Tdap) adult/adol 12/07/2007 Recorded          meningococcal conjugate vaccine 12/07/2007 Recorded          influenza virus vaccine, inactivated 12/07/2007 Recorded          hepatitis B pediatric vaccine 12/07/2007 Recorded  Lab Results       Lab Results (Last 4 results within 90 days)        Sodium Level: 139 mmol/L [135 mmol/L - 146 mmol/L] (04/02/21 11:58:00)       Potassium Level: 4.2 mmol/L [3.5 mmol/L - 5.3 mmol/L] (04/02/21 11:58:00)       Chloride Level: 103 mmol/L [98 mmol/L - 110 mmol/L] (04/02/21 11:58:00)       CO2 Level: 29 mmol/L [20 mmol/L - 32 mmol/L] (04/02/21 11:58:00)       Glucose Level: 112 mg/dL High [65 mg/dL - 99 mg/dL] (04/02/21 11:58:00)       BUN: 15 mg/dL [7 mg/dL - 25 mg/dL] (04/02/21 11:58:00)       Creatinine Level: 0.91 mg/dL [0.5 mg/dL - 0.99 mg/dL] (04/02/21 11:58:00)       BUN/Creat Ratio:  NOT APPLICABLE [6  - 22] (04/02/21 11:58:00)       eGFR: 65 mL/min/1.73m2 (04/02/21 11:58:00)       eGFR : 76 mL/min/1.73m2 (04/02/21 11:58:00)       Calcium Level: 10.3 mg/dL [8.6 mg/dL - 10.4 mg/dL] (04/02/21 11:58:00)

## 2022-02-15 NOTE — TELEPHONE ENCOUNTER
---------------------  From: Seema Cornelius LPN (eRx Pool ([id number]_[facility]))   To: Phone Messages Pool ([id number]_[place] - [place]);     Sent: 11/30/2021 9:31:08 AM CST  Subject: FW: Medication Management   Due Date/Time: 11/30/2021 12:31:00 AM CST     Pt has appt 12/2/21 with MJP.   Called and LM for call back- wondering if she has enough until her appt.        ------------------------------------------  From: [facility] [id number]  To: July Aparicio MD  Sent: November 22, 2021 12:31:16 AM CST  Subject: Medication Management  Due: November 16, 2021 3:34:48 PM CST     ** On Hold Pending Signature **     Dispensed Drug: hydroCHLOROthiazide (hydroCHLOROthiazide 25 mg oral tablet), TAKE 1 TABLET BY MOUTH EVERY DAY  Quantity: 30 tab(s)  Days Supply: 30  Refills: 0  Substitutions Allowed  Notes from Pharmacy:  ------------------------------------------

## 2022-02-28 DIAGNOSIS — I10 HYPERTENSION, UNSPECIFIED TYPE: Primary | ICD-10-CM

## 2022-02-28 NOTE — TELEPHONE ENCOUNTER
Last Written Prescription Date:  12/1/21  Last Fill Quantity: 90,  # refills: 1   Last office visit: 4/2/21 with prescribing provider: MARIBELL  Future Office Visit:        Routing to refill pool.    GABE DegrootN, RN  [facility]

## 2022-03-01 RX ORDER — HYDROCHLOROTHIAZIDE 25 MG/1
25 TABLET ORAL DAILY
Qty: 90 TABLET | Refills: 1 | Status: SHIPPED | OUTPATIENT
Start: 2022-03-01

## 2022-12-07 DIAGNOSIS — I10 HYPERTENSION, UNSPECIFIED TYPE: ICD-10-CM

## 2022-12-09 RX ORDER — HYDROCHLOROTHIAZIDE 25 MG/1
TABLET ORAL
Qty: 90 TABLET | Refills: 1 | OUTPATIENT
Start: 2022-12-09

## 2022-12-09 NOTE — TELEPHONE ENCOUNTER
Patient called back.  No longer lives in area and receives care at another facility.  States we can discontinue prescription on our end.    Writer notified patient that she will call [facility] pharmacy that requested refill to go ahead and discontinue.    Patient voiced understanding, and agreed to her script being cancelled.    PRASANNA Aguilar

## 2022-12-09 NOTE — TELEPHONE ENCOUNTER
Writer calling patient, no answer, VM left to call back clinic.    Request received to refill:  Hydrochlorothiazide    No encounters noted in her chart for yearly/preventative visits, for past 3 years.    -Does patient receive care elsewhere? If so, that Provider needs to be writing and refilling this script.  -If not, appointment needs to be made with a Provider at the clinic before this prescription can be filled.     ANY member of care team can gather information if patient returns VM.    PRASANNA Aguilar

## 2022-12-09 NOTE — TELEPHONE ENCOUNTER
Writer called [facility] pharmacy in [place], [place].  Spoke with pharmacy tech.  Requested discontinuation of hydrochlorothiazide script written by Dr. Aparicio.   Script has been discontinued.    PRASANNA Aguilar

## 2023-12-22 ENCOUNTER — TELEPHONE (OUTPATIENT)
Dept: FAMILY MEDICINE | Facility: CLINIC | Age: 68
End: 2023-12-22

## 2023-12-22 NOTE — TELEPHONE ENCOUNTER
Patient has not been seen in two years. Do you want an appointment.    Order pended if appropriate.  Susanne Benjamin CMA ............... 3:53 PM, 12/22/23

## 2023-12-22 NOTE — TELEPHONE ENCOUNTER
Order/Referral Request  Who is requesting: Patient is requesting     Orders being requested: CPAP supplies     Reason service is needed/diagnosis: Justyna BERNAL told the patient that an Rx was needed to order new CPAP supplies    When are orders needed by: As soon as they can be sent over    Does patient have a preference on a Group/Provider/Facility? [facility]    Where to send orders: Place orders within Epic    Okay to leave a detailed message?: Yes at Cell number on file:    Telephone Information:   Mobile [phone]

## 2023-12-26 NOTE — TELEPHONE ENCOUNTER
Patient moved to [place] and will not be able to do a visit. She will go see her primary in [place] for this order.  Susanne Benjamin CMA ............... 10:36 AM, 12/26/23

## 2024-01-02 NOTE — LETTER
(Inserted Image. Unable to display)     January 07, 2020      LEATHA PIZARRO  [address], [place] [id number]          Dear LEATHA,      Thank you for selecting [facility] (previously [facility] [place]) for your healthcare needs.      Our records indicate you are due for the following services:     Non-Fasting Labs.    If you had your labs done at another facility or with Direct Access Lab Testing at [facility], please bring in a copy of the results to your next visit, mail a copy, or drop off a copy of your results to your Healthcare Provider.      To schedule an appointment or if you have further questions, please contact your primary clinic:   [facility]       [phone]   [facility]       [phone]              [facility]     [phone]      Powered by [employer] and [employer]    Sincerely,    July Aparicio MD  
(Inserted Image. Unable to display)     October 14, 2019      LEATHA PIZARRO  [address] [facility] [place] [id number]          Dear LEATHA,      Thank you for selecting [facility] ([facility]viously [place]) for your healthcare needs.      Our records indicate you are due for the following services:     Clinical Support Staff (CSS)-Only Blood Pressure Check ~ Please stop in anytime to have your blood pressure rechecked. This is a free service and no appointment necessary.  So we can best determine if your medications are effective in lowering your blood pressure, please make sure your blood pressure medicine has been in your system for at least 1-2 hours prior to coming in.  We encourage you to avoid caffeine or other stimulants prior to having your blood pressure checked and come at a time when you are not feeling rushed.   If you check your blood pressure at home, please bring in your blood pressure monitor and home blood pressure readings.  We will check your machine for accuracy and also share your home readings with your Healthcare Provider.     Non-Fasting Labs.    If you had your labs done at another facility or with Direct Access Lab Testing at [facility], please bring in a copy of the results to your next visit, mail a copy, or drop off a copy of your results to your Healthcare Provider.      To schedule an appointment or if you have further questions, please contact your primary clinic:   [facility]       [phone]   [facility]       [phone]              [facility]     [phone]      Powered by [employer] and [employer]    Sincerely,    July Aparicio MD  
head injury